# Patient Record
Sex: FEMALE | Race: WHITE | Employment: PART TIME | URBAN - METROPOLITAN AREA
[De-identification: names, ages, dates, MRNs, and addresses within clinical notes are randomized per-mention and may not be internally consistent; named-entity substitution may affect disease eponyms.]

---

## 2017-01-23 ENCOUNTER — OFFICE VISIT (OUTPATIENT)
Dept: FAMILY MEDICINE CLINIC | Age: 22
End: 2017-01-23

## 2017-01-23 VITALS
HEART RATE: 84 BPM | DIASTOLIC BLOOD PRESSURE: 72 MMHG | OXYGEN SATURATION: 97 % | TEMPERATURE: 98.1 F | HEIGHT: 69 IN | SYSTOLIC BLOOD PRESSURE: 118 MMHG | RESPIRATION RATE: 16 BRPM | BODY MASS INDEX: 27.7 KG/M2 | WEIGHT: 187 LBS

## 2017-01-23 DIAGNOSIS — N92.0 MENORRHAGIA WITH REGULAR CYCLE: Primary | ICD-10-CM

## 2017-01-23 DIAGNOSIS — Z30.41 ORAL CONTRACEPTIVE USE: ICD-10-CM

## 2017-01-23 DIAGNOSIS — Z23 ENCOUNTER FOR IMMUNIZATION: ICD-10-CM

## 2017-01-23 RX ORDER — NORGESTIMATE AND ETHINYL ESTRADIOL 7DAYSX3 LO
1 KIT ORAL DAILY
Qty: 3 PACKAGE | Refills: 4 | Status: SHIPPED | OUTPATIENT
Start: 2017-01-23 | End: 2018-01-26 | Stop reason: SDUPTHER

## 2017-01-23 NOTE — MR AVS SNAPSHOT
Visit Information Date & Time Provider Department Dept. Phone Encounter #  
 1/23/2017  8:30 AM Lynn Granda, [address] [id number] Follow-up Instructions Return for need an appt for physical . Upcoming Health Maintenance Date Due  
 HPV AGE 9Y-34Y (3 of 3 - Female 3 Dose Series) 8/9/2008 PAP AKA CERVICAL CYTOLOGY 2/8/2016 DTaP/Tdap/Td series (7 - Td) 1/23/2027 Allergies as of 1/23/2017  Review Complete On: 1/23/2017 By: Lynn Granda MD  
 No Known Allergies Current Immunizations  Reviewed on 1/17/2012 Name Date DTAP Vaccine 3/5/1996, 1995, 1995, 1995 HIB Vaccine 3/5/1996, 1995, 1995, 1995 HPV (9-valent) 1/23/2017 Hepatitis B Vaccine 1995, 1995, 1995 Human Papillomavirus 4/9/2008, 8/24/2007 IPV 1995, 1995, 1995 Influenza Vaccine Split 12/2/2010 MMR Vaccine 3/5/1996 TB Skin Test (PPD) 10/19/2016 TB Skin Test (PPD) Intradermal 10/15/2013 TDAP Vaccine 7/13/2006 Not reviewed this visit You Were Diagnosed With   
  
 Codes Comments Menorrhagia with regular cycle    -  Primary ICD-10-CM: N92.0 ICD-9-CM: 626.2 Oral contraceptive use     ICD-10-CM: Z30.41 ICD-9-CM: V25.41 Encounter for immunization     ICD-10-CM: D59 ICD-9-CM: V03.89 Vitals BP Pulse Temp Resp Height(growth percentile) Weight(growth percentile) 118/72 (BP 1 Location: Left arm, BP Patient Position: Sitting) 84 98.1 °F (36.7 °C) (Oral) 16 5' 9\" (1.753 m) 187 lb (84.8 kg) LMP SpO2 BMI OB Status Smoking Status 01/17/2017 97% 27.62 kg/m2 Having regular periods Never Smoker Vitals History BMI and BSA Data Body Mass Index Body Surface Area  
 27.62 kg/m 2 2.03 m 2 Preferred Pharmacy Pharmacy Name Phone [facility], [address] [place] [address] [phone] Your Updated Medication List  
 This list is accurate as of: 1/23/17  8:52 AM.  Always use your most recent med list.  
  
  
  
  
 albuterol 90 mcg/actuation inhaler Commonly known as:  PROVENTIL HFA, VENTOLIN HFA, PROAIR HFA Take 2 Puffs by inhalation every four (4) hours as needed for Wheezing. cetirizine 10 mg tablet Commonly known as:  ZYRTEC  
TAKE ONE TABLET BY MOUTH ONE TIME DAILY  
  
 fluticasone 50 mcg/actuation nasal spray Commonly known as:  Yaakov Agata 2 Sprays by Both Nostrils route daily. montelukast 10 mg tablet Commonly known as:  SINGULAIR Take 1 Tab by mouth daily. norgestimate-ethinyl estradiol 0.18/0.215/0.25 mg-25 mcg Tab Commonly known as:  ORTHO TRI-CYCLEN LO Take 1 Tab by mouth daily. 1 tab daily as directed per package PAMPRIN MULTI-SYMPTOM PO Take  by mouth. TYLENOL 325 mg tablet Generic drug:  acetaminophen Take 650 mg by mouth daily as needed for Pain. Prescriptions Sent to Pharmacy Refills  
 norgestimate-ethinyl estradiol (ORTHO TRI-CYCLEN LO) 0.18/0.215/0.25 mg-25 mcg tab 4 Sig: Take 1 Tab by mouth daily. 1 tab daily as directed per package Class: Normal  
 Pharmacy: [address] #: [phone] Route: Oral  
  
We Performed the Following HUMAN PAPILLOMA VIRUS NONAVALENT HPV 3 DOSE IM (GARDASIL 9) [76151 CPT(R)] Follow-up Instructions Return for need an appt for physical . Introducing [facility]! Dear Kyle Wolf: 
Thank you for requesting a [employer] account. Our records indicate that you already have an active [employer] account. You can access your account anytime at [email or web address] Did you know that you can access your hospital and ER discharge instructions at any time in [employer]? You can also review all of your test results from your hospital stay or ER visit. Additional Information If you have questions, please visit the Frequently Asked Questions section of the [employer] website at [email or web address] Remember, [employer] is NOT to be used for urgent needs. For medical emergencies, dial 911. Now available from your iPhone and Android! Please provide this summary of care documentation to your next provider. Your primary care clinician is listed as [place]. If you have any questions after today's visit, please call [phone].

## 2017-01-23 NOTE — PROGRESS NOTES
Verbal order for 3rd HPV (Gardisil 9) vaccine    Patient given 3rd HPV vaccine in L deltoid. Patient tolerated well. No adverse effects noted.     Lot [id number]  Exp 04/14/2019  [facility]  [id number]

## 2017-01-23 NOTE — PROGRESS NOTES
HISTORY OF PRESENT ILLNESS  Jamie Foss is a 24 y.o. female. HPI: here to get a refill for birth control pills. She was started on it due to menorrhagia. Which has been improved. Most of the time menstrual period is regular but said this month she has missed one pill and she has period one week early. Discuss with her that it is probably due to missing pills. Observe. Denies any side effects. No mood changes. Vitals stable. Visit Vitals    /72 (BP 1 Location: Left arm, BP Patient Position: Sitting)    Pulse 84    Temp 98.1 °F (36.7 °C) (Oral)    Resp 16    Ht 5' 9\" (1.753 m)    Wt 187 lb (84.8 kg)    SpO2 97%    BMI 27.62 kg/m2   Denies any headache, dizziness, no chest pain or trouble breathing, no arm or leg weakness. No nausea or vomiting, no weight or appetite changes, no depression or anxiety. No urine or bowel complains, no palpitation, no diaphoresis. No family history of DVT. Does not smoke. Discuss with her that due for pap smear. She never had one. She will make an appt as she wants her mother to be present. ROS: see HPI     Physical Exam   Constitutional: She is oriented to person, place, and time. No distress. Neck: No thyromegaly present. Cardiovascular: Normal heart sounds. Pulmonary/Chest: No respiratory distress. She has no wheezes. Abdominal: Soft. There is no tenderness. Musculoskeletal: She exhibits no edema. Neurological: She is oriented to person, place, and time. Psychiatric: Her behavior is normal.       ASSESSMENT and PLAN    ICD-10-CM ICD-9-CM    1. Menorrhagia with regular cycle: improved on OCPs. Recently had period. No side effects. Discuss compliance with medication. She will schedule an appt for pap smear later on.  N92.0 626.2 norgestimate-ethinyl estradiol (ORTHO TRI-CYCLEN LO) 0.18/0.215/0.25 mg-25 mcg tab   2. Oral contraceptive use Z30.41 V25.41 norgestimate-ethinyl estradiol (ORTHO TRI-CYCLEN LO) 0.18/0.215/0.25 mg-25 mcg tab   3. Encounter for immunization Z23 V03.89 HUMAN PAPILLOMA VIRUS NONAVALENT HPV 3 DOSE IM (GARDASIL 9)   Refused tdap today. Ok to get 3 rd dose of HPV per patient. Pt understood and agrees with above plan.    Review    Follow-up Disposition:  Return for need an appt for physical .

## 2017-01-23 NOTE — PROGRESS NOTES
1. Have you been to the ER, urgent care clinic since your last visit? Hospitalized since your last visit? Yes 2016 [facility] [place] ED for stomach virus 8/28/16. 2. Have you seen or consulted any other health care providers outside of the [facility] since your last visit? Include any pap smears or colon screening.  No

## 2017-01-27 DIAGNOSIS — N92.0 MENORRHAGIA WITH REGULAR CYCLE: ICD-10-CM

## 2017-01-27 DIAGNOSIS — Z30.41 ORAL CONTRACEPTIVE USE: ICD-10-CM

## 2017-01-27 RX ORDER — NORGESTIMATE AND ETHINYL ESTRADIOL 7DAYSX3 LO
1 KIT ORAL DAILY
Qty: 3 PACKAGE | Refills: 4 | Status: CANCELLED | OUTPATIENT
Start: 2017-01-27

## 2017-02-22 ENCOUNTER — OFFICE VISIT (OUTPATIENT)
Dept: FAMILY MEDICINE CLINIC | Age: 22
End: 2017-02-22

## 2017-02-22 VITALS
HEART RATE: 92 BPM | TEMPERATURE: 97.9 F | DIASTOLIC BLOOD PRESSURE: 82 MMHG | RESPIRATION RATE: 14 BRPM | HEIGHT: 69 IN | OXYGEN SATURATION: 98 % | WEIGHT: 191 LBS | SYSTOLIC BLOOD PRESSURE: 116 MMHG | BODY MASS INDEX: 28.29 KG/M2

## 2017-02-22 DIAGNOSIS — Z13.1 SCREENING FOR DIABETES MELLITUS: ICD-10-CM

## 2017-02-22 DIAGNOSIS — Z01.419 WELL WOMAN EXAM WITH ROUTINE GYNECOLOGICAL EXAM: ICD-10-CM

## 2017-02-22 DIAGNOSIS — Z13.220 SCREENING FOR HYPERLIPIDEMIA: ICD-10-CM

## 2017-02-22 DIAGNOSIS — Z12.4 SCREENING FOR CERVICAL CANCER: ICD-10-CM

## 2017-02-22 DIAGNOSIS — Z13.0 SCREENING FOR DEFICIENCY ANEMIA: ICD-10-CM

## 2017-02-22 DIAGNOSIS — Z01.419 WELL WOMAN EXAM WITH ROUTINE GYNECOLOGICAL EXAM: Primary | ICD-10-CM

## 2017-02-22 NOTE — PROGRESS NOTES
Subjective:   25 y.o. female for Well Woman Check. Patient's last menstrual period was 02/07/2017 (within weeks). Regular menstrual period. No concern. Sexually active with one partner. No prior h/o std. No previous pap smear done. Sexually active since age 21. No pelvic pain or vaginal discharge. No abdominal pain. Said she gets on and off discomfort with sexual activity but no bleeding or spotting after sexual activity. Does self breast exam.   No concern. She had her friend present through out the visit and also during physical exam present in the exam room. Pt has given permission to ask all the questions and permission to do physical exam including pap smear and breast exam in her presence. Patient Active Problem List    Diagnosis Date Noted    Focal nodular hyperplasia of liver     ADHD (attention deficit hyperactivity disorder) 12/02/2010    Back pain 12/02/2010     Current Outpatient Prescriptions   Medication Sig Dispense Refill    norgestimate-ethinyl estradiol (ORTHO TRI-CYCLEN LO) 0.18/0.215/0.25 mg-25 mcg tab Take 1 Tab by mouth daily. 1 tab daily as directed per package 3 Package 4    cetirizine (ZYRTEC) 10 mg tablet TAKE ONE TABLET BY MOUTH ONE TIME DAILY 90 Tab 0    acetaminophen (TYLENOL) 325 mg tablet Take 650 mg by mouth daily as needed for Pain.  montelukast (SINGULAIR) 10 mg tablet Take 1 Tab by mouth daily. 30 Tab 3    albuterol (PROVENTIL HFA, VENTOLIN HFA) 90 mcg/actuation inhaler Take 2 Puffs by inhalation every four (4) hours as needed for Wheezing. 1 Inhaler 3    fluticasone (FLONASE) 50 mcg/actuation nasal spray 2 Sprays by Both Nostrils route daily. 1 Bottle 2    ACETAMINOPHEN/PYRILAM/PAMABROM (PAMPRIN MULTI-SYMPTOM PO) Take  by mouth.          No Known Allergies  Past Medical History:   Diagnosis Date    ADHD     weaned off on meds, per pt doing well    AR (allergic rhinitis)     Asthma     Dyslexia     Focal nodular hyperplasia of liver     recommend MRI repeat 7/2014    Kidney stone     RAD (reactive airway disease)      No past surgical history on file. Family History   Problem Relation Age of Onset    Diabetes Father     Hypertension Brother     Hypertension Maternal Grandfather     Heart Attack Maternal Grandfather     Heart Surgery Maternal Grandfather     Diabetes Paternal Grandmother     Hypertension Paternal Grandmother      Social History   Substance Use Topics    Smoking status: Never Smoker    Smokeless tobacco: Never Used    Alcohol use No        ROS:  Feeling well. No dyspnea or chest pain on exertion. No abdominal pain, change in bowel habits, black or bloody stools. No urinary tract symptoms. GYN ROS: normal menses, no abnormal bleeding, pelvic pain or discharge, no breast pain or new or enlarging lumps on self exam. No neurological complaints. Objective:     Visit Vitals    /82 (BP 1 Location: Left arm, BP Patient Position: Sitting)    Pulse 92    Temp 97.9 °F (36.6 °C) (Oral)    Resp 14    Ht 5' 9\" (1.753 m)    Wt 191 lb (86.6 kg)    LMP 02/07/2017 (Within Weeks)    SpO2 98%    BMI 28.21 kg/m2     The patient appears well, alert, oriented x 3, in no distress. ENT normal.  Neck supple. No adenopathy or thyromegaly. KRISTA. Lungs are clear, good air entry, no wheezes, rhonchi or rales. S1 and S2 normal, no murmurs, regular rate and rhythm. Abdomen soft without tenderness, guarding, mass or organomegaly. Extremities show no edema, normal peripheral pulses. Neurological is normal, no focal findings. BREAST EXAM: breasts appear normal, no suspicious masses, no skin or nipple changes or axillary nodes    PELVIC EXAM: normal external genitalia, vulva, vagina, cervix, uterus and adnexa  Breast exam and pelvic exam done in presence of nurse ET. Assessment/Plan:       ICD-10-CM ICD-9-CM    1. Well woman exam with routine gynecological exam Y59.056 B04.71 METABOLIC PANEL, COMPREHENSIVE    [V72.31]   2.  Screening for cervical cancer Z12.4 V76.2 PAP IG, CT-NG-TV, APTIMA HPV AND RFX 63/34,36([id number])   3. Screening for deficiency anemia Z13.0 V78.1 CBC W/O DIFF   4. Screening for diabetes mellitus Z13.1 V77.1 HEMOGLOBIN A1C WITH EAG   5. Screening for hyperlipidemia Z13.220 V77.91 LIPID PANEL     Follow-up Disposition:  Return in about 1 year (around 2/22/2018). Gladys Hair

## 2017-02-22 NOTE — MR AVS SNAPSHOT
Visit Information Date & Time Provider Department Dept. Phone Encounter #  
 2/22/2017  2:30 PM Dominik James, 0 [facility] [phone] [id number] Follow-up Instructions Return in about 1 year (around 2/22/2018). Upcoming Health Maintenance Date Due  
 PAP AKA CERVICAL CYTOLOGY 2/8/2016 DTaP/Tdap/Td series (7 - Td) 1/23/2027 Allergies as of 2/22/2017  Review Complete On: 1/23/2017 By: Dominik James MD  
 No Known Allergies Current Immunizations  Reviewed on 1/17/2012 Name Date DTAP Vaccine 3/5/1996, 1995, 1995, 1995 HIB Vaccine 3/5/1996, 1995, 1995, 1995 HPV (9-valent) 1/23/2017 Hepatitis B Vaccine 1995, 1995, 1995 Human Papillomavirus 4/9/2008, 8/24/2007 IPV 1995, 1995, 1995 Influenza Vaccine Split 12/2/2010 MMR Vaccine 3/5/1996 TB Skin Test (PPD) 10/19/2016 TB Skin Test (PPD) Intradermal 10/15/2013 TDAP Vaccine 7/13/2006 Not reviewed this visit You Were Diagnosed With   
  
 Codes Comments Well woman exam with routine gynecological exam    -  Primary ICD-10-CM: O74.553 ICD-9-CM: V72.31 [V72.31] Screening for cervical cancer     ICD-10-CM: Z12.4 ICD-9-CM: V76.2 Screening for deficiency anemia     ICD-10-CM: Z13.0 ICD-9-CM: V78.1 Screening for diabetes mellitus     ICD-10-CM: Z13.1 ICD-9-CM: V77.1 Screening for hyperlipidemia     ICD-10-CM: Z13.220 ICD-9-CM: V77.91 Vitals BP  
  
  
  
  
  
 116/82 (BP 1 Location: Left arm, BP Patient Position: Sitting) Vitals History BMI and BSA Data Body Mass Index Body Surface Area  
 28.21 kg/m 2 2.05 m 2 Preferred Pharmacy Pharmacy Name Phone [facility], [address] [place] [address] [phone] Your Updated Medication List  
  
   
This list is accurate as of: 2/22/17  3:31 PM.  Always use your most recent med list.  
  
  
  
  
 albuterol 90 mcg/actuation inhaler Commonly known as:  PROVENTIL HFA, VENTOLIN HFA, PROAIR HFA Take 2 Puffs by inhalation every four (4) hours as needed for Wheezing. cetirizine 10 mg tablet Commonly known as:  ZYRTEC  
TAKE ONE TABLET BY MOUTH ONE TIME DAILY  
  
 fluticasone 50 mcg/actuation nasal spray Commonly known as:  Devora Reges 2 Sprays by Both Nostrils route daily. montelukast 10 mg tablet Commonly known as:  SINGULAIR Take 1 Tab by mouth daily. norgestimate-ethinyl estradiol 0.18/0.215/0.25 mg-25 mcg Tab Commonly known as:  ORTHO TRI-CYCLEN LO Take 1 Tab by mouth daily. 1 tab daily as directed per package PAMPRIN MULTI-SYMPTOM PO Take  by mouth. TYLENOL 325 mg tablet Generic drug:  acetaminophen Take 650 mg by mouth daily as needed for Pain. Follow-up Instructions Return in about 1 year (around 2/22/2018). To-Do List   
 02/22/2017 Lab:  CBC W/O DIFF   
  
 02/22/2017 Lab:  HEMOGLOBIN A1C WITH EAG   
  
 02/22/2017 Lab:  LIPID PANEL   
  
 02/22/2017 Lab:  METABOLIC PANEL, COMPREHENSIVE   
  
 02/22/2017 Pathology:  PAP IG, CT-NG-TV, APTIMA HPV AND [address] 91/61,54([id number]) [facility]! Dear Mecca Kwan: 
Thank you for requesting a [employer] account. Our records indicate that you already have an active [employer] account. You can access your account anytime at [email or web address] Did you know that you can access your hospital and ER discharge instructions at any time in [employer]? You can also review all of your test results from your hospital stay or ER visit. Additional Information If you have questions, please visit the Frequently Asked Questions section of the [employer] website at [email or web address] Remember, [employer] is NOT to be used for urgent needs. For medical emergencies, dial 911. Now available from your iPhone and Android! Please provide this summary of care documentation to your next provider. Your primary care clinician is listed as Rosana Laura. If you have any questions after today's visit, please call [phone].

## 2017-02-22 NOTE — PROGRESS NOTES
Chief Complaint   Patient presents with    Well Woman     with pap; pain with intercourse; denies discharge     Patient presents for annual pap smear. Last pap-this will be the first. Abnormal Pap smears: n/a. Procedures if indicated: n/a . Form of contraception: oral. Mammogram (if indicated) n/a. Family history of breast CA: patient denies  colon CA: patient denies  cervical CA: patient denies  Tetanus: due for booster    1. Have you been to the ER, urgent care clinic since your last visit? Hospitalized since your last visit? No    2. Have you seen or consulted any other health care providers outside of the [address] [place] [address] since your last visit? Include any pap smears or colon screening.  No

## 2017-02-24 LAB
CHLAMYDIA TRACHOMATIS THINPREP, 13342: NEGATIVE
HPV HIGH RISK, 507303: NEGATIVE
NEISSERIA GONORRHOEAE THINPREP, 13343: NEGATIVE
PAP IMAGE GUIDED, 8900296: NORMAL
TRICHOMONAS NUC AMP-THIN PREP,13357: NEGATIVE

## 2017-03-09 NOTE — PROGRESS NOTES
Spoke with patient (2 verifiers name/) regarding pap smear, HPV test and STD testing came back negative. Patient voiced understanding.

## 2018-01-29 PROBLEM — N92.0 MENORRHAGIA WITH REGULAR CYCLE: Status: ACTIVE | Noted: 2018-01-29

## 2018-02-20 ENCOUNTER — HOSPITAL ENCOUNTER (OUTPATIENT)
Dept: GENERAL RADIOLOGY | Age: 23
Discharge: HOME | End: 2018-02-20
Admitting: FAMILY MEDICINE
Payer: COMMERCIAL

## 2018-02-20 ENCOUNTER — OFFICE VISIT (OUTPATIENT)
Dept: FAMILY MEDICINE CLINIC | Age: 23
End: 2018-02-20

## 2018-02-20 VITALS
TEMPERATURE: 97.8 F | BODY MASS INDEX: 30.18 KG/M2 | WEIGHT: 203.8 LBS | RESPIRATION RATE: 16 BRPM | SYSTOLIC BLOOD PRESSURE: 120 MMHG | OXYGEN SATURATION: 94 % | HEIGHT: 69 IN | HEART RATE: 107 BPM | DIASTOLIC BLOOD PRESSURE: 80 MMHG

## 2018-02-20 DIAGNOSIS — R22.1 NECK SWELLING: ICD-10-CM

## 2018-02-20 DIAGNOSIS — N92.0 MENORRHAGIA WITH REGULAR CYCLE: ICD-10-CM

## 2018-02-20 DIAGNOSIS — R60.0 SALIVARY GLAND SWELLING: Primary | ICD-10-CM

## 2018-02-20 PROCEDURE — 70360 X-RAY EXAM OF NECK: CPT

## 2018-02-20 RX ORDER — NORGESTIMATE AND ETHINYL ESTRADIOL 7DAYSX3 LO
1 KIT ORAL DAILY
Qty: 84 TAB | Refills: 3 | Status: SHIPPED | OUTPATIENT
Start: 2018-02-20 | End: 2019-02-15 | Stop reason: SDUPTHER

## 2018-02-20 NOTE — MR AVS SNAPSHOT
[address] [place] [address] [address] [place] [address] 
[phone] Patient: Justin Art MRN: [id number] HPI:7/0/2379 Visit Information Date & Time Provider Department Dept. Phone Encounter #  
 2/20/2018 11:45 AM Elmer Ramachandran, [address] [place] [address] Road [id number] Follow-up Instructions Return in about 2 weeks (around 3/6/2018). Upcoming Health Maintenance Date Due  
 PAP AKA CERVICAL CYTOLOGY 2/23/2020 DTaP/Tdap/Td series (7 - Td) 1/23/2027 Allergies as of 2/20/2018  Review Complete On: 2/20/2018 By: Elmer Ramachandran MD  
 No Known Allergies Current Immunizations  Reviewed on 1/17/2012 Name Date DTAP Vaccine 3/5/1996, 1995, 1995, 1995 HIB Vaccine 3/5/1996, 1995, 1995, 1995 HPV (9-valent) 1/23/2017 Hepatitis B Vaccine 1995, 1995, 1995 Human Papillomavirus 4/9/2008, 8/24/2007 IPV 1995, 1995, 1995 Influenza Vaccine Split 12/2/2010 MMR Vaccine 3/5/1996 TB Skin Test (PPD) 10/19/2016 TB Skin Test (PPD) Intradermal 10/15/2013 TDAP Vaccine 7/13/2006 Not reviewed this visit You Were Diagnosed With   
  
 Codes Comments Salivary gland swelling    -  Primary ICD-10-CM: R60.9 ICD-9-CM: 782.3 Menorrhagia with regular cycle     ICD-10-CM: N92.0 ICD-9-CM: 626.2 Neck swelling     ICD-10-CM: R22.1 ICD-9-CM: 212. 2 Vitals BP Pulse Temp Resp Height(growth percentile) Weight(growth percentile) 120/80 (BP 1 Location: Left arm, BP Patient Position: Sitting) (!) 107 97.8 °F (36.6 °C) (Oral) 16 5' 9\" (1.753 m) 203 lb 12.8 oz (92.4 kg) LMP SpO2 BMI OB Status Smoking Status 02/01/2018 94% 30.1 kg/m2 Having regular periods Never Smoker Vitals History BMI and BSA Data Body Mass Index Body Surface Area  
 30.1 kg/m 2 2.12 m 2 Preferred Pharmacy Pharmacy Name Phone [address] [phone] Your Updated Medication List  
  
   
This list is accurate as of: 2/20/18 12:32 PM.  Always use your most recent med list.  
  
  
  
  
 albuterol 90 mcg/actuation inhaler Commonly known as:  PROVENTIL HFA, VENTOLIN HFA, PROAIR HFA Take 2 Puffs by inhalation every four (4) hours as needed for Wheezing. cetirizine 10 mg tablet Commonly known as:  ZYRTEC  
TAKE ONE TABLET BY MOUTH ONE TIME DAILY  
  
 fluticasone 50 mcg/actuation nasal spray Commonly known as:  Taylor Finders 2 Sprays by Both Nostrils route daily. montelukast 10 mg tablet Commonly known as:  SINGULAIR Take 1 Tab by mouth daily. norgestimate-ethinyl estradiol 0.18/0.215/0.25 mg-25 mcg Tab Commonly known as:  TRI-LO-HARRISON Take 1 Tab by mouth daily. PAMPRIN MULTI-SYMPTOM PO Take  by mouth. TYLENOL 325 mg tablet Generic drug:  acetaminophen Take 650 mg by mouth daily as needed for Pain. Prescriptions Sent to Pharmacy Refills  
 norgestimate-ethinyl estradiol (TRI-LO-HARRISON) 0.18/0.215/0.25 mg-25 mcg tab 3 Sig: Take 1 Tab by mouth daily. Class: Normal  
 Pharmacy: [address] #: [phone] Route: Oral  
  
Follow-up Instructions Return in about 2 weeks (around 3/6/2018). To-Do List   
 02/20/2018 Imaging:  XR NECK SOFT TISSUE Patient Instructions Mononucleosis: Care Instructions Your Care Instructions Mononucleosis, also called mono, is an infection that is usually caused by the Hector-Barr virus. Mono is spread through contact with saliva, mucus from the nose and throat, and sometimes tears or blood. You can get mono by kissing a person who is infected. Or you may get it by sharing a drinking glass or eating utensils with someone who has mono. That person may not be sick at the time or may have had mono long before. Mono may cause your spleen to swell. The spleen is an organ in the upper left side of your belly. A blow to the belly can cause a swollen spleen to break open. In very rare cases, the spleen may burst on its own. Most people recover fully after several weeks. But it may take several months before your normal energy is back. The lymph nodes in your neck may be larger than normal for up to 1 month. Getting lots of rest and keeping your schedule light will help you feel better. Time helps you recover. Follow-up care is a key part of your treatment and safety. Be sure to make and go to all appointments, and call your doctor if you are having problems. It's also a good idea to know your test results and keep a list of the medicines you take. How can you care for yourself at home? · Get plenty of rest. Stay in bed until you feel well enough to be up. · Drink plenty of fluids, enough so that your urine is light yellow or clear like water. If you have kidney, heart, or liver disease and have to limit fluids, talk with your doctor before you increase the amount of fluids you drink. · Take your medicines exactly as prescribed. Call your doctor if you think you are having a problem with your medicine. · For a sore throat, suck on lozenges or gargle with salt water. To make salt water, mix 1 teaspoon of salt in 8 ounces of warm water. · Take an over-the-counter pain medicine, such as acetaminophen (Tylenol), ibuprofen (Advil, Motrin), or naproxen (Aleve) for a sore throat or headache or to lower a fever. Read and follow all instructions on the label. · Do not take two or more pain medicines at the same time unless the doctor told you to. Many pain medicines have acetaminophen, which is Tylenol. Too much acetaminophen (Tylenol) can be harmful. · Do not play contact sports for 4 weeks. Do not lift anything heavy.  Too much activity increases the chance that your spleen may break open. · Try not to spread the virus to others. Do not kiss or share dishes, glasses, eating utensils, or toothbrushes for at least two weeks. The virus is spread when saliva from an infected person gets in another person's mouth. It is hard to know how long you may be contagious. · If you know you have mono, do not donate blood. There is a chance of spreading the virus through blood products. When should you call for help? Call 911 anytime you think you may need emergency care. For example, call if: 
? · You passed out (lost consciousness). ?Call your doctor now or seek immediate medical care if: 
? · You have new or worse belly pain. ? · You have signs of needing more fluids. You have sunken eyes and a dry mouth, and you pass only a little urine. ? · You are dizzy or lightheaded, or you feel like you may faint. ? · You cannot swallow fluids. ? Watch closely for changes in your health, and be sure to contact your doctor if: 
? · You do not get better as expected. Where can you learn more? Go to http://mikaela-amaris.info/. Enter C782 in the search box to learn more about \"Mononucleosis: Care Instructions. \" Current as of: March 3, 2017 Content Version: 11.4 © 5607-6870 [employer]. Care instructions adapted under license by [employer] (which disclaims liability or warranty for this information). If you have questions about a medical condition or this instruction, always ask your healthcare professional. [facility] any warranty or liability for your use of this information. Introducing [facility]! Dear Shannon Clark: 
Thank you for requesting a [employer] account. Our records indicate that you already have an active [employer] account. You can access your account anytime at [email or web address] Did you know that you can access your hospital and ER discharge instructions at any time in [employer]? You can also review all of your test results from your hospital stay or ER visit. Additional Information If you have questions, please visit the Frequently Asked Questions section of the [employer] website at [email or web address] Remember, [employer] is NOT to be used for urgent needs. For medical emergencies, dial 911. Now available from your iPhone and Android! Please provide this summary of care documentation to your next provider. Your primary care clinician is listed as Marcos Delgado. If you have any questions after today's visit, please call [phone].

## 2018-02-20 NOTE — PROGRESS NOTES
HISTORY OF PRESENT ILLNESS  Martha Moss is a 21 y.o. female. HPI: Here [facility]. [facility] had swollen [facility] glands [facility] She was diagnosed with infectious mononucleosis. She had no fever, no sore throat. No trouble swallowing or any change in voice. No noisy breathing. No abdominal pain. No nausea or vomiting. Still looks neck swollen. No obvious gland palpable . no abdominal pain or splenic enlargement. Visit Vitals    /80 (BP 1 Location: Left arm, BP Patient Position: Sitting)    Pulse (!) 107    Temp 97.8 °F (36.6 °C) (Oral)    Resp 16    Ht 5' 9\" (1.753 m)    Wt 203 lb 12.8 oz (92.4 kg)    SpO2 94%    BMI 30.1 kg/m2     Review medication list, vitals, problem list,allergies. ROS: see HPI     Physical Exam   Constitutional: She is oriented to person, place, and time. No distress. HENT:   Neck: general appearance looks swollen. No palpable neck gland enlargement. Cardiovascular: Normal heart sounds. Pulmonary/Chest: No respiratory distress. She has no wheezes. Abdominal: Soft. She exhibits no mass. There is no tenderness. Neurological: She is oriented to person, place, and time. ASSESSMENT and PLAN    ICD-10-CM ICD-9-CM    1. Salivary gland swelling: improved at this time and still looks neck swollen. ? Soft tissue swelling. Will obtain neck x-ray. Per her positive test for mononucleosis. [facility]. record release signed. R60.9 782.3    2. Menorrhagia with regular cycle N92.0 626.2 norgestimate-ethinyl estradiol (TRI-LO-HARRISON) 0.18/0.215/0.25 mg-25 mcg tab   3. Neck swelling R22.1 784. 2 XR NECK SOFT TISSUE   Pt understood and agree with the plan     Follow-up Disposition:  Return in about 2 weeks (around 3/6/2018).

## 2018-02-20 NOTE — PROGRESS NOTES
1. Have you been to the ER, urgent care clinic since your last visit? Hospitalized since your last visit? [facility] [address] 2/08/18    2. Have you seen or consulted any other health care providers outside of the [address] [place] [address] since your last visit? Include any pap smears or colon screening. No    Patient declined flu vaccine.

## 2018-02-20 NOTE — PATIENT INSTRUCTIONS
Mononucleosis: Care Instructions  Your Care Instructions    Mononucleosis, also called mono, is an infection that is usually caused by the Hector-Barr virus. Mono is spread through contact with saliva, mucus from the nose and throat, and sometimes tears or blood. You can get mono by kissing a person who is infected. Or you may get it by sharing a drinking glass or eating utensils with someone who has mono. That person may not be sick at the time or may have had mono long before. Mono may cause your spleen to swell. The spleen is an organ in the upper left side of your belly. A blow to the belly can cause a swollen spleen to break open. In very rare cases, the spleen may burst on its own. Most people recover fully after several weeks. But it may take several months before your normal energy is back. The lymph nodes in your neck may be larger than normal for up to 1 month. Getting lots of rest and keeping your schedule light will help you feel better. Time helps you recover. Follow-up care is a key part of your treatment and safety. Be sure to make and go to all appointments, and call your doctor if you are having problems. It's also a good idea to know your test results and keep a list of the medicines you take. How can you care for yourself at home? · Get plenty of rest. Stay in bed until you feel well enough to be up. · Drink plenty of fluids, enough so that your urine is light yellow or clear like water. If you have kidney, heart, or liver disease and have to limit fluids, talk with your doctor before you increase the amount of fluids you drink. · Take your medicines exactly as prescribed. Call your doctor if you think you are having a problem with your medicine. · For a sore throat, suck on lozenges or gargle with salt water. To make salt water, mix 1 teaspoon of salt in 8 ounces of warm water.   · Take an over-the-counter pain medicine, such as acetaminophen (Tylenol), ibuprofen (Advil, Motrin), or naproxen (Aleve) for a sore throat or headache or to lower a fever. Read and follow all instructions on the label. · Do not take two or more pain medicines at the same time unless the doctor told you to. Many pain medicines have acetaminophen, which is Tylenol. Too much acetaminophen (Tylenol) can be harmful. · Do not play contact sports for 4 weeks. Do not lift anything heavy. Too much activity increases the chance that your spleen may break open. · Try not to spread the virus to others. Do not kiss or share dishes, glasses, eating utensils, or toothbrushes for at least two weeks. The virus is spread when saliva from an infected person gets in another person's mouth. It is hard to know how long you may be contagious. · If you know you have mono, do not donate blood. There is a chance of spreading the virus through blood products. When should you call for help? Call 911 anytime you think you may need emergency care. For example, call if:  ? · You passed out (lost consciousness). ?Call your doctor now or seek immediate medical care if:  ? · You have new or worse belly pain. ? · You have signs of needing more fluids. You have sunken eyes and a dry mouth, and you pass only a little urine. ? · You are dizzy or lightheaded, or you feel like you may faint. ? · You cannot swallow fluids. ? Watch closely for changes in your health, and be sure to contact your doctor if:  ? · You do not get better as expected. Where can you learn more? Go to http://mikaela-amaris.info/. Enter N825 in the search box to learn more about \"Mononucleosis: Care Instructions. \"  Current as of: March 3, 2017  Content Version: 11.4  © 9462-3233 [employer]. Care instructions adapted under license by [employer] (which disclaims liability or warranty for this information).  If you have questions about a medical condition or this instruction, always ask your healthcare professional. [employer] disclaims any warranty or liability for your use of this information.

## 2018-02-21 NOTE — PROGRESS NOTES
Let pt know that air way is patent. Some swelling could be from mononucleosis. For now keep follow up appt and if any worsening of symptoms like change in voice, trouble swallowing or sore throat , fever or any enlarge neck gland again go to ER or urgent care.

## 2018-03-08 ENCOUNTER — TELEPHONE (OUTPATIENT)
Dept: FAMILY MEDICINE CLINIC | Age: 23
End: 2018-03-08

## 2018-03-09 NOTE — TELEPHONE ENCOUNTER
Contacted patient and verified identity using name and date of birth (2- identifiers)  Spoke with patient regarding Xray results and she reports that her sx have resolved. Patient aware of results.

## 2018-03-19 ENCOUNTER — OFFICE VISIT (OUTPATIENT)
Dept: FAMILY MEDICINE CLINIC | Age: 23
End: 2018-03-19

## 2018-03-19 VITALS
OXYGEN SATURATION: 97 % | HEIGHT: 69 IN | RESPIRATION RATE: 16 BRPM | WEIGHT: 211 LBS | TEMPERATURE: 97.9 F | HEART RATE: 91 BPM | SYSTOLIC BLOOD PRESSURE: 118 MMHG | DIASTOLIC BLOOD PRESSURE: 72 MMHG | BODY MASS INDEX: 31.25 KG/M2

## 2018-03-19 DIAGNOSIS — R22.1 NECK SWELLING: Primary | ICD-10-CM

## 2018-03-19 NOTE — MR AVS SNAPSHOT
[facility]ly [facility] [address] 
[phone] Patient: Natacha Molina MRN: [id number] CEX:9[birth year] Visit Information Date & Time Provider Department Dept. Phone Encounter #  
 3/19/2018  9:15 AM Carroll Roblero, [address] [id number] Follow-up Instructions Return in about 4 months (around 7/19/2018). Upcoming Health Maintenance Date Due  
 PAP AKA CERVICAL CYTOLOGY 2/23/2020 DTaP/Tdap/Td series (7 - Td) 1/23/2027 Allergies as of 3/19/2018  Review Complete On: 3/19/2018 By: Carroll Roblero MD  
 No Known Allergies Current Immunizations  Reviewed on 1/17/2012 Name Date DTAP Vaccine 3/5/1996, 1995, 1995, 1995 HIB Vaccine 3/5/1996, 1995, 1995, 1995 HPV (9-valent) 1/23/2017 Hepatitis B Vaccine 1995, 1995, 1995 Human Papillomavirus 4/9/2008, 8/24/2007 IPV 1995, 1995, 1995 Influenza Vaccine Split 12/2/2010 MMR Vaccine 3/5/1996 TB Skin Test (PPD) 10/19/2016 TB Skin Test (PPD) Intradermal 10/15/2013 TDAP Vaccine 7/13/2006 Not reviewed this visit You Were Diagnosed With   
  
 Codes Comments Neck swelling    -  Primary ICD-10-CM: R22.1 ICD-9-CM: 784.2 BMI 31.0-31.9,adult     ICD-10-CM: Z68.31 
ICD-9-CM: V85.31 Vitals BP Pulse Temp Resp Height(growth percentile) Weight(growth percentile) 118/72 (BP 1 Location: Left arm, BP Patient Position: Sitting) 91 97.9 °F (36.6 °C) (Oral) 16 5' 9\" (1.753 m) 211 lb (95.7 kg) LMP SpO2 BMI OB Status Smoking Status 03/05/2018 97% 31.16 kg/m2 Having regular periods Never Smoker BMI and BSA Data Body Mass Index Body Surface Area  
 31.16 kg/m 2 2.16 m 2 Preferred Pharmacy Pharmacy Name Phone [facility], [address] [place] [address] [phone] Your Updated Medication List  
  
   
This list is accurate as of 3/19/18  9:50 AM.  Always use your most recent med list.  
  
  
  
  
 albuterol 90 mcg/actuation inhaler Commonly known as:  PROVENTIL HFA, VENTOLIN HFA, PROAIR HFA Take 2 Puffs by inhalation every four (4) hours as needed for Wheezing. cetirizine 10 mg tablet Commonly known as:  ZYRTEC  
TAKE ONE TABLET BY MOUTH ONE TIME DAILY  
  
 fluticasone 50 mcg/actuation nasal spray Commonly known as:  Marsa Albe 2 Sprays by Both Nostrils route daily. montelukast 10 mg tablet Commonly known as:  SINGULAIR Take 1 Tab by mouth daily. norgestimate-ethinyl estradiol 0.18/0.215/0.25 mg-25 mcg Tab Commonly known as:  TRI-LO-HARRISON Take 1 Tab by mouth daily. PAMPRIN MULTI-SYMPTOM PO Take  by mouth. TYLENOL 325 mg tablet Generic drug:  acetaminophen Take 650 mg by mouth daily as needed for Pain. Follow-up Instructions Return in about 4 months (around 7/19/2018). Patient Instructions Mononucleosis: Care Instructions Your Care Instructions Mononucleosis, also called mono, is an infection that is usually caused by the Hector-Barr virus. Mono is spread through contact with saliva, mucus from the nose and throat, and sometimes tears or blood. You can get mono by kissing a person who is infected. Or you may get it by sharing a drinking glass or eating utensils with someone who has mono. That person may not be sick at the time or may have had mono long before. Mono may cause your spleen to swell. The spleen is an organ in the upper left side of your belly. A blow to the belly can cause a swollen spleen to break open. In very rare cases, the spleen may burst on its own. Most people recover fully after several weeks. But it may take several months before your normal energy is back.  The lymph nodes in your neck may be larger than normal for up to 1 month. Getting lots of rest and keeping your schedule light will help you feel better. Time helps you recover. Follow-up care is a key part of your treatment and safety. Be sure to make and go to all appointments, and call your doctor if you are having problems. It's also a good idea to know your test results and keep a list of the medicines you take. How can you care for yourself at home? · Get plenty of rest. Stay in bed until you feel well enough to be up. · Drink plenty of fluids, enough so that your urine is light yellow or clear like water. If you have kidney, heart, or liver disease and have to limit fluids, talk with your doctor before you increase the amount of fluids you drink. · Take your medicines exactly as prescribed. Call your doctor if you think you are having a problem with your medicine. · For a sore throat, suck on lozenges or gargle with salt water. To make salt water, mix 1 teaspoon of salt in 8 ounces of warm water. · Take an over-the-counter pain medicine, such as acetaminophen (Tylenol), ibuprofen (Advil, Motrin), or naproxen (Aleve) for a sore throat or headache or to lower a fever. Read and follow all instructions on the label. · Do not take two or more pain medicines at the same time unless the doctor told you to. Many pain medicines have acetaminophen, which is Tylenol. Too much acetaminophen (Tylenol) can be harmful. · Do not play contact sports for 4 weeks. Do not lift anything heavy. Too much activity increases the chance that your spleen may break open. · Try not to spread the virus to others. Do not kiss or share dishes, glasses, eating utensils, or toothbrushes for at least two weeks. The virus is spread when saliva from an infected person gets in another person's mouth. It is hard to know how long you may be contagious. · If you know you have mono, do not donate blood. There is a chance of spreading the virus through blood products. When should you call for help? Call 911 anytime you think you may need emergency care. For example, call if: 
? · You passed out (lost consciousness). ?Call your doctor now or seek immediate medical care if: 
? · You have new or worse belly pain. ? · You have signs of needing more fluids. You have sunken eyes and a dry mouth, and you pass only a little urine. ? · You are dizzy or lightheaded, or you feel like you may faint. ? · You cannot swallow fluids. ? Watch closely for changes in your health, and be sure to contact your doctor if: 
? · You do not get better as expected. Where can you learn more? Go to http://mikaela-amaris.info/. Enter K668 in the search box to learn more about \"Mononucleosis: Care Instructions. \" Current as of: March 3, 2017 Content Version: 11.4 © 4973-0036 [employer]. Care instructions adapted under license by [employer] (which disclaims liability or warranty for this information). If you have questions about a medical condition or this instruction, always ask your healthcare professional. [place] [address] any warranty or liability for your use of this information. Learning About Low-Carbohydrate Diets for Weight Loss What is a low-carbohydrate diet? Low-carb diets avoid foods that are high in carbohydrate. These high-carb foods include pasta, bread, rice, cereal, fruits, and starchy vegetables. Instead, these diets usually have you eat foods that are high in fat and protein. Many people lose weight quickly on a low-carb diet. But the early weight loss is water. People on this diet often gain the weight back after they start eating carbs again. Not all diet plans are safe or work well. A lot of the evidence shows that low-carb diets aren't healthy. That's because these diets often don't include healthy foods like fruits and vegetables.  Losing weight safely means balancing protein, fat, and carbs with every meal and snack. And low-carb diets don't always provide the vitamins, minerals, and fiber you need. If you have a serious medical condition, talk to your doctor before you try any diet. These conditions include kidney disease, heart disease, type 2 diabetes, high cholesterol, and high blood pressure. If you are pregnant, it may not be safe for your baby if you are on a low-carb diet. How can you lose weight safely? You might have heard that a diet plan helped another person lose weight. But that doesn't mean that it will work for you. It is very hard to stay on a diet that includes lots of big changes in your eating habits. If you want to get to a healthy weight and stay there, making healthy lifestyle changes will often work better than dieting. These steps can help. · Make a plan for change. Work with your doctor to create a plan that is right for you. · See a dietitian. He or she can show you how to make healthy changes in your eating habits. · Manage stress. If you have a lot of stress in your life, it can be hard to focus on making healthy changes to your daily habits. · Track your food and activity. You are likely to do better at losing weight if you keep track of what you eat and what you do. Follow-up care is a key part of your treatment and safety. Be sure to make and go to all appointments, and call your doctor if you are having problems. It's also a good idea to know your test results and keep a list of the medicines you take. Where can you learn more? Go to http://mikaela-amaris.info/. Enter A121 in the search box to learn more about \"Learning About Low-Carbohydrate Diets for Weight Loss. \" Current as of: May 12, 2017 Content Version: 11.4 © 2697-6029 [employer].  Care instructions adapted under license by [employer] (which disclaims liability or warranty for this information). If you have questions about a medical condition or this instruction, always ask your healthcare professional. [address] any warranty or liability for your use of this information. Learning About Physical Activity What is physical activity? Physical activity is any kind of activity that gets your body moving. The types of physical activity that can help you get fit and stay healthy include: · Aerobic or \"cardio\" activities that make your heart beat faster and make you breathe harder, such as brisk walking, riding a bike, or running. Aerobic activities strengthen your heart and lungs and build up your endurance. · Strength training activities that make your muscles work against, or \"resist,\" something, such as lifting weights or doing push-ups. These activities help tone and strengthen your muscles. · Stretches that allow you to move your joints and muscles through their full range of motion. Stretching helps you be more flexible and avoid injury. What are the benefits of physical activity? Being active is one of the best things you can do to get fit and stay healthy. It helps you to: · Feel stronger and have more energy to do all the things you like to do. · Focus better at school or work and perform better in sports. · Feel, think, and sleep better. · Reach and stay at a healthy weight. · Lose fat and build lean muscle. · Lower your risk for serious health problems. · Keep your bones, muscles, and joints strong. Being fit lets you do more physical activity. And it lets you work out harder without as much effort. How can you make physical activity part of your life? Get at least 30 minutes of exercise on most days of the week. Walking is a good choice. You also may want to do other activities, such as running, swimming, cycling, or playing tennis or team sports.  
Pick activities that you like-ones that make your heart beat faster, your muscles stronger, and your muscles and joints more flexible. If you find more than one thing you like doing, do them all. You don't have to do the same thing every day. Get your heart pumping every day. Any activity that makes your heart beat faster and keeps it at that rate for a while counts. Here are some great ways to get your heart beating faster: · Go for a brisk walk, run, or bike ride. · Go for a hike or swim. · Go in-line skating. · Play a game of touch football, basketball, or soccer. · Ride a bike. · Play tennis or racquetball. · Climb stairs. Even some household chores can be aerobic-just do them at a faster pace. Vacuuming, raking or mowing the lawn, sweeping the garage, and washing and waxing the car all can help get your heart rate up. Strengthen your muscles during the week. You don't have to lift heavy weights or grow big, bulky muscles to get stronger. Doing a few simple activities that make your muscles work against, or \"resist,\" something can help you get stronger. For example, you can: · Do push-ups or sit-ups, which use your own body weight as resistance. · Lift weights or dumbbells or use stretch bands at home or in a gym or community center. Stretch your muscles often. Stretching will help you as you become more active. It can help you stay flexible, loosen tight muscles, and avoid injury. It can also help improve your balance and posture and can be a great way to relax. Be sure to stretch the muscles you'll be using when you work out. It's best to warm your muscles slightly before you stretch them. Walk or do some other light aerobic activity for a few minutes, and then start stretching. When you stretch your muscles: · Do it slowly. Stretching is not about going fast or making sudden movements. · Don't push or bounce during a stretch. · Hold each stretch for at least 15 to 30 seconds, if you can. You should feel a stretch in the muscle, but not pain. · Breathe out as you do the stretch. Then breathe in as you hold the stretch. Don't hold your breath. If you're worried about how more activity might affect your health, have a checkup before you start. Follow any special advice your doctor gives you for getting a smart start. Where can you learn more? Go to http://mikaela-amaris.info/. Enter I302 in the search box to learn more about \"Learning About Physical Activity. \" Current as of: March 13, 2017 Content Version: 11.4 © 2663-7758 [employer]. Care instructions adapted under license by [employer] (which disclaims liability or warranty for this information). If you have questions about a medical condition or this instruction, always ask your healthcare professional. [address] any warranty or liability for your use of this information. Introducing [facility]! Dear Gilberto Miles: 
Thank you for requesting a Punchbowl account. Our records indicate that you already have an active Punchbowl account. You can access your account anytime at [email or web address] Did you know that you can access your hospital and ER discharge instructions at any time in Punchbowl? You can also review all of your test results from your hospital stay or ER visit. Additional Information If you have questions, please visit the Frequently Asked Questions section of the Punchbowl website at [email or web address] Remember, Punchbowl is NOT to be used for urgent needs. For medical emergencies, dial 911. Now available from your iPhone and Android! Please provide this summary of care documentation to your next provider. Your primary care clinician is listed as Jose Luis Bolaños. If you have any questions after today's visit, please call [phone].

## 2018-03-19 NOTE — PATIENT INSTRUCTIONS
Mononucleosis: Care Instructions  Your Care Instructions    Mononucleosis, also called mono, is an infection that is usually caused by the Hector-Barr virus. Mono is spread through contact with saliva, mucus from the nose and throat, and sometimes tears or blood. You can get mono by kissing a person who is infected. Or you may get it by sharing a drinking glass or eating utensils with someone who has mono. That person may not be sick at the time or may have had mono long before. Mono may cause your spleen to swell. The spleen is an organ in the upper left side of your belly. A blow to the belly can cause a swollen spleen to break open. In very rare cases, the spleen may burst on its own. Most people recover fully after several weeks. But it may take several months before your normal energy is back. The lymph nodes in your neck may be larger than normal for up to 1 month. Getting lots of rest and keeping your schedule light will help you feel better. Time helps you recover. Follow-up care is a key part of your treatment and safety. Be sure to make and go to all appointments, and call your doctor if you are having problems. It's also a good idea to know your test results and keep a list of the medicines you take. How can you care for yourself at home? · Get plenty of rest. Stay in bed until you feel well enough to be up. · Drink plenty of fluids, enough so that your urine is light yellow or clear like water. If you have kidney, heart, or liver disease and have to limit fluids, talk with your doctor before you increase the amount of fluids you drink. · Take your medicines exactly as prescribed. Call your doctor if you think you are having a problem with your medicine. · For a sore throat, suck on lozenges or gargle with salt water. To make salt water, mix 1 teaspoon of salt in 8 ounces of warm water.   · Take an over-the-counter pain medicine, such as acetaminophen (Tylenol), ibuprofen (Advil, Motrin), or naproxen (Aleve) for a sore throat or headache or to lower a fever. Read and follow all instructions on the label. · Do not take two or more pain medicines at the same time unless the doctor told you to. Many pain medicines have acetaminophen, which is Tylenol. Too much acetaminophen (Tylenol) can be harmful. · Do not play contact sports for 4 weeks. Do not lift anything heavy. Too much activity increases the chance that your spleen may break open. · Try not to spread the virus to others. Do not kiss or share dishes, glasses, eating utensils, or toothbrushes for at least two weeks. The virus is spread when saliva from an infected person gets in another person's mouth. It is hard to know how long you may be contagious. · If you know you have mono, do not donate blood. There is a chance of spreading the virus through blood products. When should you call for help? Call 911 anytime you think you may need emergency care. For example, call if:  ? · You passed out (lost consciousness). ?Call your doctor now or seek immediate medical care if:  ? · You have new or worse belly pain. ? · You have signs of needing more fluids. You have sunken eyes and a dry mouth, and you pass only a little urine. ? · You are dizzy or lightheaded, or you feel like you may faint. ? · You cannot swallow fluids. ? Watch closely for changes in your health, and be sure to contact your doctor if:  ? · You do not get better as expected. Where can you learn more? Go to http://mikaela-amaris.info/. Enter F281 in the search box to learn more about \"Mononucleosis: Care Instructions. \"  Current as of: March 3, 2017  Content Version: 11.4  © 8335-9634 [employer]. Care instructions adapted under license by [employer] (which disclaims liability or warranty for this information).  If you have questions about a medical condition or this instruction, always ask your healthcare professional. [employer] disclaims any warranty or liability for your use of this information. Learning About Low-Carbohydrate Diets for Weight Loss  What is a low-carbohydrate diet? Low-carb diets avoid foods that are high in carbohydrate. These high-carb foods include pasta, bread, rice, cereal, fruits, and starchy vegetables. Instead, these diets usually have you eat foods that are high in fat and protein. Many people lose weight quickly on a low-carb diet. But the early weight loss is water. People on this diet often gain the weight back after they start eating carbs again. Not all diet plans are safe or work well. A lot of the evidence shows that low-carb diets aren't healthy. That's because these diets often don't include healthy foods like fruits and vegetables. Losing weight safely means balancing protein, fat, and carbs with every meal and snack. And low-carb diets don't always provide the vitamins, minerals, and fiber you need. If you have a serious medical condition, talk to your doctor before you try any diet. These conditions include kidney disease, heart disease, type 2 diabetes, high cholesterol, and high blood pressure. If you are pregnant, it may not be safe for your baby if you are on a low-carb diet. How can you lose weight safely? You might have heard that a diet plan helped another person lose weight. But that doesn't mean that it will work for you. It is very hard to stay on a diet that includes lots of big changes in your eating habits. If you want to get to a healthy weight and stay there, making healthy lifestyle changes will often work better than dieting. These steps can help. · Make a plan for change. Work with your doctor to create a plan that is right for you. · See a dietitian. He or she can show you how to make healthy changes in your eating habits. · Manage stress.  If you have a lot of stress in your life, it can be hard to focus on making healthy changes to your daily habits. · Track your food and activity. You are likely to do better at losing weight if you keep track of what you eat and what you do. Follow-up care is a key part of your treatment and safety. Be sure to make and go to all appointments, and call your doctor if you are having problems. It's also a good idea to know your test results and keep a list of the medicines you take. Where can you learn more? Go to http://mikaela-amaris.info/. Enter A121 in the search box to learn more about \"Learning About Low-Carbohydrate Diets for Weight Loss. \"  Current as of: May 12, 2017  Content Version: 11.4  © 0737-0026 [employer]. Care instructions adapted under license by [employer] (which disclaims liability or warranty for this information). If you have questions about a medical condition or this instruction, always ask your healthcare professional. [address] any warranty or liability for your use of this information. Learning About Physical Activity  What is physical activity? Physical activity is any kind of activity that gets your body moving. The types of physical activity that can help you get fit and stay healthy include:  · Aerobic or \"cardio\" activities that make your heart beat faster and make you breathe harder, such as brisk walking, riding a bike, or running. Aerobic activities strengthen your heart and lungs and build up your endurance. · Strength training activities that make your muscles work against, or \"resist,\" something, such as lifting weights or doing push-ups. These activities help tone and strengthen your muscles. · Stretches that allow you to move your joints and muscles through their full range of motion. Stretching helps you be more flexible and avoid injury. What are the benefits of physical activity? Being active is one of the best things you can do to get fit and stay healthy.  It helps you to:  · Feel stronger and have more energy to do all the things you like to do. · Focus better at school or work and perform better in sports. · Feel, think, and sleep better. · Reach and stay at a healthy weight. · Lose fat and build lean muscle. · Lower your risk for serious health problems. · Keep your bones, muscles, and joints strong. Being fit lets you do more physical activity. And it lets you work out harder without as much effort. How can you make physical activity part of your life? Get at least 30 minutes of exercise on most days of the week. Walking is a good choice. You also may want to do other activities, such as running, swimming, cycling, or playing tennis or team sports. Pick activities that you like-ones that make your heart beat faster, your muscles stronger, and your muscles and joints more flexible. If you find more than one thing you like doing, do them all. You don't have to do the same thing every day. Get your heart pumping every day. Any activity that makes your heart beat faster and keeps it at that rate for a while counts. Here are some great ways to get your heart beating faster:  · Go for a brisk walk, run, or bike ride. · Go for a hike or swim. · Go in-line skating. · Play a game of touch football, basketball, or soccer. · Ride a bike. · Play tennis or racquetball. · Climb stairs. Even some household chores can be aerobic-just do them at a faster pace. Vacuuming, raking or mowing the lawn, sweeping the garage, and washing and waxing the car all can help get your heart rate up. Strengthen your muscles during the week. You don't have to lift heavy weights or grow big, bulky muscles to get stronger. Doing a few simple activities that make your muscles work against, or \"resist,\" something can help you get stronger. For example, you can:  · Do push-ups or sit-ups, which use your own body weight as resistance.   · Lift weights or dumbbells or use stretch bands at home or in a gym or community center. Stretch your muscles often. Stretching will help you as you become more active. It can help you stay flexible, loosen tight muscles, and avoid injury. It can also help improve your balance and posture and can be a great way to relax. Be sure to stretch the muscles you'll be using when you work out. It's best to warm your muscles slightly before you stretch them. Walk or do some other light aerobic activity for a few minutes, and then start stretching. When you stretch your muscles:  · Do it slowly. Stretching is not about going fast or making sudden movements. · Don't push or bounce during a stretch. · Hold each stretch for at least 15 to 30 seconds, if you can. You should feel a stretch in the muscle, but not pain. · Breathe out as you do the stretch. Then breathe in as you hold the stretch. Don't hold your breath. If you're worried about how more activity might affect your health, have a checkup before you start. Follow any special advice your doctor gives you for getting a smart start. Where can you learn more? Go to http://mikaela-amaris.info/. Enter L466 in the search box to learn more about \"Learning About Physical Activity. \"  Current as of: March 13, 2017  Content Version: 11.4  © 1972-4720 [employer]. Care instructions adapted under license by [employer] (which disclaims liability or warranty for this information). If you have questions about a medical condition or this instruction, always ask your healthcare professional. [place] [address] any warranty or liability for your use of this information.

## 2018-03-19 NOTE — PROGRESS NOTES
HISTORY OF PRESENT ILLNESS  Sonya Herman is a 21 y.o. female. HPI: Here for follow up on swollen neck glands. Was diagnosed with infectious mononucleosis. Last visit felt swollen neck and done x=ray . Noted edema vs body habitus. She is asymptomatic. No sore throat. No fever. No nausea or vomiting, no strider. No trouble swallowing or chocking. No cough or cold symptoms. No nausea or vomiting, no abdominal pain. No urinary or bowel complains. Today on exam no palpable lymph nodes or neck glands. Visit Vitals    /72 (BP 1 Location: Left arm, BP Patient Position: Sitting)    Pulse 91    Temp 97.9 °F (36.6 °C) (Oral)    Resp 16    Ht 5' 9\" (1.753 m)    Wt 211 lb (95.7 kg)    SpO2 97%    BMI 31.16 kg/m2     Review medication list, vitals, problem list,allergies. ROS: see HPI     Physical Exam   Constitutional: She is oriented to person, place, and time. No distress. HENT:   Neck: no palpable lymph nodes . No palpable salivary glands. Cardiovascular: Normal heart sounds. Abdominal: Soft. She exhibits no mass. There is no tenderness. Neurological: She is oriented to person, place, and time. Psychiatric: Her behavior is normal.       ASSESSMENT and PLAN    ICD-10-CM ICD-9-CM    1. Neck swelling: asymptomatic with pain or palpable salivary gland or lymph nodes. For now will observe. R22.1 784.2    2. BMI 31.0-31.9,adult: noted almost 50 lbs weight gain since 2016. She mentioned that she has been eating more fast food and not exercising. Discussed healthy life style. Diet modification and importance of weight loss and exercise. She understood and agree to do life style modification. Hand out of diet modification and exercise given in AVS.  Z68.31 V85.31    Pt understood and agree with the plan   Review    Follow-up Disposition:  Return in about 4 months (around 7/19/2018).

## 2018-03-19 NOTE — PROGRESS NOTES
1. Have you been to the ER, urgent care clinic since your last visit? Hospitalized since your last visit? No    2. Have you seen or consulted any other health care providers outside of the [address] [place] [address] since your last visit? Include any pap smears or colon screening.  No

## 2018-06-26 ENCOUNTER — OFFICE VISIT (OUTPATIENT)
Dept: FAMILY MEDICINE CLINIC | Age: 23
End: 2018-06-26

## 2018-06-26 ENCOUNTER — HOSPITAL ENCOUNTER (OUTPATIENT)
Dept: LAB | Age: 23
Discharge: HOME | End: 2018-06-26

## 2018-06-26 VITALS
HEIGHT: 69 IN | WEIGHT: 211 LBS | OXYGEN SATURATION: 98 % | SYSTOLIC BLOOD PRESSURE: 116 MMHG | HEART RATE: 85 BPM | RESPIRATION RATE: 16 BRPM | DIASTOLIC BLOOD PRESSURE: 80 MMHG | BODY MASS INDEX: 31.25 KG/M2 | TEMPERATURE: 97.6 F

## 2018-06-26 DIAGNOSIS — Z13.220 SCREENING FOR HYPERLIPIDEMIA: ICD-10-CM

## 2018-06-26 DIAGNOSIS — R22.1 NECK SWELLING: Primary | ICD-10-CM

## 2018-06-26 DIAGNOSIS — Z87.09 H/O INTRINSIC ASTHMA: ICD-10-CM

## 2018-06-26 DIAGNOSIS — Z13.1 SCREENING FOR DIABETES MELLITUS: ICD-10-CM

## 2018-06-26 DIAGNOSIS — R22.1 NECK SWELLING: ICD-10-CM

## 2018-06-26 DIAGNOSIS — Z13.0 SCREENING FOR DEFICIENCY ANEMIA: ICD-10-CM

## 2018-06-26 DIAGNOSIS — R14.0 BLOATING: ICD-10-CM

## 2018-06-26 DIAGNOSIS — H92.01 EARACHE ON RIGHT: ICD-10-CM

## 2018-06-26 LAB — SENTARA SPECIMEN COL,SENBCF: NORMAL

## 2018-06-26 PROCEDURE — 99001 SPECIMEN HANDLING PT-LAB: CPT | Performed by: FAMILY MEDICINE

## 2018-06-26 RX ORDER — MONTELUKAST SODIUM 10 MG/1
10 TABLET ORAL DAILY
Qty: 30 TAB | Refills: 3 | Status: SHIPPED | OUTPATIENT
Start: 2018-06-26 | End: 2018-10-16 | Stop reason: SDUPTHER

## 2018-06-26 RX ORDER — ALBUTEROL SULFATE 90 UG/1
2 AEROSOL, METERED RESPIRATORY (INHALATION)
Qty: 1 INHALER | Refills: 3 | Status: SHIPPED | OUTPATIENT
Start: 2018-06-26 | End: 2021-08-27 | Stop reason: SDUPTHER

## 2018-06-26 NOTE — PROGRESS NOTES
HISTORY OF PRESENT ILLNESS  Douglas Palacios is a 21 y.o. female. HPI: Here for follow up. Had neck swelling and palpable salivary gland last visit. Also was diagnosed with infectious mononucleosis. Neck x-ray showed edema vs normal body habitus. Currently no neck gland swelling or palpable lymph nodes . no fever. No trouble swallowing or chocking episode. Feeling bloating on and off. generalize abdominal discomfort. Feels it is more when she is stressed. Denies any mood changes. No sleep concern. Asymptomatic at this time. Discuss to take probiotic. No nausea or vomiting. No unexplained weight loss. H/o asthma. Stable. Taking zyrtec. No cough or wheezing. Need albuterol refill but denies any increase use of albuterol. Visit Vitals    /80 (BP 1 Location: Left arm, BP Patient Position: Sitting)    Pulse 85    Temp 97.6 °F (36.4 °C) (Oral)    Resp 16    Ht 5' 9\" (1.753 m)    Wt 211 lb (95.7 kg)    SpO2 98%    BMI 31.16 kg/m2     Review medication list, vitals, problem list,allergies. Had rt earache for few days but no pain at this time. No cold or cough. No headache or dizziness. Agree to get preventive labs. No known thyroid abnormality. discussed high BMI. Discussed diet modification, calorie count and exercise. Discussed importance of weight loss. agree to do exercise and life style modification. Diet and exercise hand out given in AVS.   Regular menstrual period. LMp last week. ROS: see HPI     Physical Exam   Constitutional: She is oriented to person, place, and time. No distress. HENT:   Ears: TM intact, normal light reflex bilaterally. No erythema or ear discharge noted. Neck: No thyromegaly present. Cardiovascular: Normal heart sounds. Pulmonary/Chest: No respiratory distress. She has no wheezes. Abdominal: Soft. Bowel sounds are normal. There is no tenderness. Musculoskeletal: She exhibits no edema. Lymphadenopathy:     She has no cervical adenopathy.    Neurological: She is oriented to person, place, and time. Psychiatric: Her behavior is normal. Thought content normal.       ASSESSMENT and PLAN    ICD-10-CM ICD-9-CM    1. Neck swelling: improved. No palpable neck glands or lymph nodes. Probably secondary to infectious mononucleosis. R22.1 784.2 CBC W/O DIFF      METABOLIC PANEL, COMPREHENSIVE   2. H/O intrinsic asthma: stable. For now advised daily singulair and zyrtec as needed. Z87.09 V12.69 montelukast (SINGULAIR) 10 mg tablet   3. BMI 31.0-31.9,adult: discussed high BMI. Discussed diet modification, calorie count and exercise. Discussed importance of weight loss. agree to do exercise and life style modification. Diet and exercise hand out given in AVS.    Z68.31 V85.31 TSH 3RD GENERATION   4. Earache on right: normal exam. She is also asymptomatic. H92.01 388.70    5. Bloating: avoid spicy and fried food. Probiotic daily. If no improvement schedule a sooner appt.  R14.0 787.3    6. Screening for deficiency anemia Z13.0 V78.1    7.  Screening for hyperlipidemia Z13.220 V77.91 LIPID PANEL   8. Screening for diabetes mellitus Z13.1 V77.1 HEMOGLOBIN A1C WITH EAG   Pt understood and agree with the plan     Follow-up Disposition:  Return for for physical .

## 2018-06-26 NOTE — MR AVS SNAPSHOT
[facility] [address] [place] [address] 
[phone] Patient: Pete Welsh MRN: [id number] [facility]:[id number] Visit Information Date & Time Provider Department Dept. Phone Encounter #  
 6/26/2018  9:45 AM Cassandra Trinh, [address] [place] [address] Road [id number] Follow-up Instructions Return for for physical . Upcoming Health Maintenance Date Due Influenza Age 5 to Adult 8/1/2018 PAP AKA CERVICAL CYTOLOGY 2/23/2020 DTaP/Tdap/Td series (7 - Td) 1/23/2027 Allergies as of 6/26/2018  Review Complete On: 6/26/2018 By: Cassandra Trinh MD  
 No Known Allergies Current Immunizations  Reviewed on 1/17/2012 Name Date DTAP Vaccine 3/5/1996, 1995, 1995, 1995 HIB Vaccine 3/5/1996, 1995, 1995, 1995 HPV (9-valent) 1/23/2017 Hepatitis B Vaccine 1995, 1995, 1995 Human Papillomavirus 4/9/2008, 8/24/2007 IPV 1995, 1995, 1995 Influenza Vaccine Split 12/2/2010 MMR Vaccine 3/5/1996 TB Skin Test (PPD) 10/19/2016 TB Skin Test (PPD) Intradermal 10/15/2013 TDAP Vaccine 7/13/2006 Not reviewed this visit You Were Diagnosed With   
  
 Codes Comments Neck swelling    -  Primary ICD-10-CM: R22.1 ICD-9-CM: 784.2 H/O intrinsic asthma     ICD-10-CM: Z87.09 
ICD-9-CM: V12.69 BMI 31.0-31.9,adult     ICD-10-CM: Z68.31 
ICD-9-CM: V85.31 Earache on right     ICD-10-CM: H92.01 
ICD-9-CM: 388.70 Bloating     ICD-10-CM: R14.0 ICD-9-CM: 787.3 Screening for deficiency anemia     ICD-10-CM: Z13.0 ICD-9-CM: V78.1 Screening for hyperlipidemia     ICD-10-CM: Z13.220 ICD-9-CM: V77.91 Screening for diabetes mellitus     ICD-10-CM: Z13.1 ICD-9-CM: V77.1 Vitals BP Pulse Temp Resp Height(growth percentile) Weight(growth percentile) 116/80 (BP 1 Location: Left arm, BP Patient Position: Sitting) 85 97.6 °F (36.4 °C) (Oral) 16 5' 9\" (1.753 m) 211 lb (95.7 kg) SpO2 BMI OB Status Smoking Status 98% 31.16 kg/m2 Having regular periods Never Smoker BMI and BSA Data Body Mass Index Body Surface Area  
 31.16 kg/m 2 2.16 m 2 Preferred Pharmacy Pharmacy Name Phone [address] [phone] Your Updated Medication List  
  
   
This list is accurate as of 6/26/18 10:13 AM.  Always use your most recent med list.  
  
  
  
  
 albuterol 90 mcg/actuation inhaler Commonly known as:  PROVENTIL HFA, VENTOLIN HFA, PROAIR HFA Take 2 Puffs by inhalation every four (4) hours as needed for Wheezing. cetirizine 10 mg tablet Commonly known as:  ZYRTEC  
TAKE ONE TABLET BY MOUTH ONE TIME DAILY  
  
 fluticasone 50 mcg/actuation nasal spray Commonly known as:  Curtistine Paci 2 Sprays by Both Nostrils route daily. montelukast 10 mg tablet Commonly known as:  SINGULAIR Take 1 Tab by mouth daily. norgestimate-ethinyl estradiol 0.18/0.215/0.25 mg-25 mcg Tab Commonly known as:  TRI-LO-HARRISON Take 1 Tab by mouth daily. PAMPRIN MULTI-SYMPTOM PO Take  by mouth. TYLENOL 325 mg tablet Generic drug:  acetaminophen Take 650 mg by mouth daily as needed for Pain. Prescriptions Sent to Pharmacy Refills  
 albuterol (PROVENTIL HFA, VENTOLIN HFA, PROAIR HFA) 90 mcg/actuation inhaler 3 Sig: Take 2 Puffs by inhalation every four (4) hours as needed for Wheezing. Class: Normal  
 Pharmacy: [address] Ph #: [phone] Route: Inhalation  
 montelukast (SINGULAIR) 10 mg tablet 3 Sig: Take 1 Tab by mouth daily. Class: Normal  
 Pharmacy: [address] Ph #: [phone] Route: Oral  
  
Follow-up Instructions Return for for physical . To-Do List   
 06/26/2018 Lab:  CBC W/O DIFF   
  
 06/26/2018 Lab:  HEMOGLOBIN A1C WITH EAG   
  
 06/26/2018 Lab:  LIPID PANEL   
  
 06/26/2018 Lab:  METABOLIC PANEL, COMPREHENSIVE   
  
 06/26/2018 Lab:  TSH 3RD GENERATION Patient Instructions Learning About Low-Carbohydrate Diets for Weight Loss What is a low-carbohydrate diet? Low-carb diets avoid foods that are high in carbohydrate. These high-carb foods include pasta, bread, rice, cereal, fruits, and starchy vegetables. Instead, these diets usually have you eat foods that are high in fat and protein. Many people lose weight quickly on a low-carb diet. But the early weight loss is water. People on this diet often gain the weight back after they start eating carbs again. Not all diet plans are safe or work well. A lot of the evidence shows that low-carb diets aren't healthy. That's because these diets often don't include healthy foods like fruits and vegetables. Losing weight safely means balancing protein, fat, and carbs with every meal and snack. And low-carb diets don't always provide the vitamins, minerals, and fiber you need. If you have a serious medical condition, talk to your doctor before you try any diet. These conditions include kidney disease, heart disease, type 2 diabetes, high cholesterol, and high blood pressure. If you are pregnant, it may not be safe for your baby if you are on a low-carb diet. How can you lose weight safely? You might have heard that a diet plan helped another person lose weight. But that doesn't mean that it will work for you. It is very hard to stay on a diet that includes lots of big changes in your eating habits. If you want to get to a healthy weight and stay there, making healthy lifestyle changes will often work better than dieting. These steps can help. · Make a plan for change.  Work with your doctor to create a plan that is right for you. · See a dietitian. He or she can show you how to make healthy changes in your eating habits. · Manage stress. If you have a lot of stress in your life, it can be hard to focus on making healthy changes to your daily habits. · Track your food and activity. You are likely to do better at losing weight if you keep track of what you eat and what you do. Follow-up care is a key part of your treatment and safety. Be sure to make and go to all appointments, and call your doctor if you are having problems. It's also a good idea to know your test results and keep a list of the medicines you take. Where can you learn more? Go to http://mikaela-amaris.info/. Enter A121 in the search box to learn more about \"Learning About Low-Carbohydrate Diets for Weight Loss. \" Current as of: May 12, 2017 Content Version: 11.4 © 9506-6731 [employer]. Care instructions adapted under license by [employer] (which disclaims liability or warranty for this information). If you have questions about a medical condition or this instruction, always ask your healthcare professional. [place] [address] any warranty or liability for your use of this information. Learning About Physical Activity What is physical activity? Physical activity is any kind of activity that gets your body moving. The types of physical activity that can help you get fit and stay healthy include: · Aerobic or \"cardio\" activities that make your heart beat faster and make you breathe harder, such as brisk walking, riding a bike, or running. Aerobic activities strengthen your heart and lungs and build up your endurance. · Strength training activities that make your muscles work against, or \"resist,\" something, such as lifting weights or doing push-ups. These activities help tone and strengthen your muscles.  
· Stretches that allow you to move your joints and muscles through their full range of motion. Stretching helps you be more flexible and avoid injury. What are the benefits of physical activity? Being active is one of the best things you can do to get fit and stay healthy. It helps you to: · Feel stronger and have more energy to do all the things you like to do. · Focus better at school or work and perform better in sports. · Feel, think, and sleep better. · Reach and stay at a healthy weight. · Lose fat and build lean muscle. · Lower your risk for serious health problems. · Keep your bones, muscles, and joints strong. Being fit lets you do more physical activity. And it lets you work out harder without as much effort. How can you make physical activity part of your life? Get at least 30 minutes of exercise on most days of the week. Walking is a good choice. You also may want to do other activities, such as running, swimming, cycling, or playing tennis or team sports. Pick activities that you like-ones that make your heart beat faster, your muscles stronger, and your muscles and joints more flexible. If you find more than one thing you like doing, do them all. You don't have to do the same thing every day. Get your heart pumping every day. Any activity that makes your heart beat faster and keeps it at that rate for a while counts. Here are some great ways to get your heart beating faster: · Go for a brisk walk, run, or bike ride. · Go for a hike or swim. · Go in-line skating. · Play a game of touch football, basketball, or soccer. · Ride a bike. · Play tennis or racquetball. · Climb stairs. Even some household chores can be aerobic-just do them at a faster pace. Vacuuming, raking or mowing the lawn, sweeping the garage, and washing and waxing the car all can help get your heart rate up. Strengthen your muscles during the week. You don't have to lift heavy weights or grow big, bulky muscles to get stronger.  Doing a few simple activities that make your muscles work against, or \"resist,\" something can help you get stronger. For example, you can: · Do push-ups or sit-ups, which use your own body weight as resistance. · Lift weights or dumbbells or use stretch bands at home or in a gym or community center. Stretch your muscles often. Stretching will help you as you become more active. It can help you stay flexible, loosen tight muscles, and avoid injury. It can also help improve your balance and posture and can be a great way to relax. Be sure to stretch the muscles you'll be using when you work out. It's best to warm your muscles slightly before you stretch them. Walk or do some other light aerobic activity for a few minutes, and then start stretching. When you stretch your muscles: · Do it slowly. Stretching is not about going fast or making sudden movements. · Don't push or bounce during a stretch. · Hold each stretch for at least 15 to 30 seconds, if you can. You should feel a stretch in the muscle, but not pain. · Breathe out as you do the stretch. Then breathe in as you hold the stretch. Don't hold your breath. If you're worried about how more activity might affect your health, have a checkup before you start. Follow any special advice your doctor gives you for getting a smart start. Where can you learn more? Go to http://mikaela-amaris.info/. Enter S516 in the search box to learn more about \"Learning About Physical Activity. \" Current as of: March 13, 2017 Content Version: 11.4 © 8600-7373 [employer]. Care instructions adapted under license by [employer] (which disclaims liability or warranty for this information). If you have questions about a medical condition or this instruction, always ask your healthcare professional. [address] any warranty or liability for your use of this information. Introducing [facility]! Dear Crystal Maher: Thank you for requesting a [employer] account. Our records indicate that you already have an active [employer] account. You can access your account anytime at [email or web address] Did you know that you can access your hospital and ER discharge instructions at any time in [employer]? You can also review all of your test results from your hospital stay or ER visit. Additional Information If you have questions, please visit the Frequently Asked Questions section of the [employer] website at [email or web address] Remember, [employer] is NOT to be used for urgent needs. For medical emergencies, dial 911. Now available from your iPhone and Android! Please provide this summary of care documentation to your next provider. Your primary care clinician is listed as John Bishop. If you have any questions after today's visit, please call [phone].

## 2018-06-26 NOTE — PROGRESS NOTES
Chief Complaint   Patient presents with    Neck Swelling     FU- resolved but states she was having pain, now not hurting at all    Behavioral Problem     ADHD    Ear Pain     hurting up till today     1. Have you been to the ER, urgent care clinic since your last visit? Hospitalized since your last visit? No    2. Have you seen or consulted any other health care providers outside of the [facility] since your last visit? Include any pap smears or colon screening.  No

## 2018-06-26 NOTE — LETTER
NOTIFICATION RETURN TO WORK / SCHOOL 
 
6/26/2018 10:13 AM 
 
Ms. Alex Marrero [address] [facility] [address] To Whom It May Concern: 
 
Alex Marrero is currently under the care of [facility]. If there are questions or concerns please have the patient contact our office. Sincerely, Christy Garcia MD

## 2018-06-26 NOTE — PATIENT INSTRUCTIONS
Learning About Low-Carbohydrate Diets for Weight Loss  What is a low-carbohydrate diet? Low-carb diets avoid foods that are high in carbohydrate. These high-carb foods include pasta, bread, rice, cereal, fruits, and starchy vegetables. Instead, these diets usually have you eat foods that are high in fat and protein. Many people lose weight quickly on a low-carb diet. But the early weight loss is water. People on this diet often gain the weight back after they start eating carbs again. Not all diet plans are safe or work well. A lot of the evidence shows that low-carb diets aren't healthy. That's because these diets often don't include healthy foods like fruits and vegetables. Losing weight safely means balancing protein, fat, and carbs with every meal and snack. And low-carb diets don't always provide the vitamins, minerals, and fiber you need. If you have a serious medical condition, talk to your doctor before you try any diet. These conditions include kidney disease, heart disease, type 2 diabetes, high cholesterol, and high blood pressure. If you are pregnant, it may not be safe for your baby if you are on a low-carb diet. How can you lose weight safely? You might have heard that a diet plan helped another person lose weight. But that doesn't mean that it will work for you. It is very hard to stay on a diet that includes lots of big changes in your eating habits. If you want to get to a healthy weight and stay there, making healthy lifestyle changes will often work better than dieting. These steps can help. · Make a plan for change. Work with your doctor to create a plan that is right for you. · See a dietitian. He or she can show you how to make healthy changes in your eating habits. · Manage stress. If you have a lot of stress in your life, it can be hard to focus on making healthy changes to your daily habits. · Track your food and activity.  You are likely to do better at losing weight if you keep track of what you eat and what you do. Follow-up care is a key part of your treatment and safety. Be sure to make and go to all appointments, and call your doctor if you are having problems. It's also a good idea to know your test results and keep a list of the medicines you take. Where can you learn more? Go to http://mikaela-amaris.info/. Enter A121 in the search box to learn more about \"Learning About Low-Carbohydrate Diets for Weight Loss. \"  Current as of: May 12, 2017  Content Version: 11.4  © 6323-5654 [employer]. Care instructions adapted under license by [facility] (which disclaims liability or warranty for this information). If you have questions about a medical condition or this instruction, always ask your healthcare professional. [address] any warranty or liability for your use of this information. Learning About Physical Activity  What is physical activity? Physical activity is any kind of activity that gets your body moving. The types of physical activity that can help you get fit and stay healthy include:  · Aerobic or \"cardio\" activities that make your heart beat faster and make you breathe harder, such as brisk walking, riding a bike, or running. Aerobic activities strengthen your heart and lungs and build up your endurance. · Strength training activities that make your muscles work against, or \"resist,\" something, such as lifting weights or doing push-ups. These activities help tone and strengthen your muscles. · Stretches that allow you to move your joints and muscles through their full range of motion. Stretching helps you be more flexible and avoid injury. What are the benefits of physical activity? Being active is one of the best things you can do to get fit and stay healthy. It helps you to:  · Feel stronger and have more energy to do all the things you like to do.   · Focus better at school or work and perform better in sports. · Feel, think, and sleep better. · Reach and stay at a healthy weight. · Lose fat and build lean muscle. · Lower your risk for serious health problems. · Keep your bones, muscles, and joints strong. Being fit lets you do more physical activity. And it lets you work out harder without as much effort. How can you make physical activity part of your life? Get at least 30 minutes of exercise on most days of the week. Walking is a good choice. You also may want to do other activities, such as running, swimming, cycling, or playing tennis or team sports. Pick activities that you like-ones that make your heart beat faster, your muscles stronger, and your muscles and joints more flexible. If you find more than one thing you like doing, do them all. You don't have to do the same thing every day. Get your heart pumping every day. Any activity that makes your heart beat faster and keeps it at that rate for a while counts. Here are some great ways to get your heart beating faster:  · Go for a brisk walk, run, or bike ride. · Go for a hike or swim. · Go in-line skating. · Play a game of touch football, basketball, or soccer. · Ride a bike. · Play tennis or racquetball. · Climb stairs. Even some household chores can be aerobic-just do them at a faster pace. Vacuuming, raking or mowing the lawn, sweeping the garage, and washing and waxing the car all can help get your heart rate up. Strengthen your muscles during the week. You don't have to lift heavy weights or grow big, bulky muscles to get stronger. Doing a few simple activities that make your muscles work against, or \"resist,\" something can help you get stronger. For example, you can:  · Do push-ups or sit-ups, which use your own body weight as resistance. · Lift weights or dumbbells or use stretch bands at home or in a gym or community center. Stretch your muscles often. Stretching will help you as you become more active.  It can help you stay flexible, loosen tight muscles, and avoid injury. It can also help improve your balance and posture and can be a great way to relax. Be sure to stretch the muscles you'll be using when you work out. It's best to warm your muscles slightly before you stretch them. Walk or do some other light aerobic activity for a few minutes, and then start stretching. When you stretch your muscles:  · Do it slowly. Stretching is not about going fast or making sudden movements. · Don't push or bounce during a stretch. · Hold each stretch for at least 15 to 30 seconds, if you can. You should feel a stretch in the muscle, but not pain. · Breathe out as you do the stretch. Then breathe in as you hold the stretch. Don't hold your breath. If you're worried about how more activity might affect your health, have a checkup before you start. Follow any special advice your doctor gives you for getting a smart start. Where can you learn more? Go to http://mikaela-amaris.info/. Enter X240 in the search box to learn more about \"Learning About Physical Activity. \"  Current as of: March 13, 2017  Content Version: 11.4  © 3254-1259 [employer]. Care instructions adapted under license by [employer] (which disclaims liability or warranty for this information). If you have questions about a medical condition or this instruction, always ask your healthcare professional. [place] [address] any warranty or liability for your use of this information.

## 2018-06-27 LAB
A-G RATIO,AGRAT: 1.6 RATIO (ref 1.1–2.6)
ALBUMIN SERPL-MCNC: 4.3 G/DL (ref 3.5–5)
ALP SERPL-CCNC: 82 U/L (ref 25–115)
ALT SERPL-CCNC: 10 U/L (ref 5–40)
ANION GAP SERPL CALC-SCNC: 14 MMOL/L
AST SERPL W P-5'-P-CCNC: 10 U/L (ref 10–37)
AVG GLU, 10930: 95 MG/DL (ref 91–123)
BILIRUB SERPL-MCNC: 0.6 MG/DL (ref 0.2–1.2)
BUN SERPL-MCNC: 15 MG/DL (ref 6–22)
CALCIUM SERPL-MCNC: 9.7 MG/DL (ref 8.4–10.5)
CHLORIDE SERPL-SCNC: 101 MMOL/L (ref 98–110)
CHOLEST SERPL-MCNC: 149 MG/DL (ref 110–200)
CO2 SERPL-SCNC: 25 MMOL/L (ref 20–32)
CREAT SERPL-MCNC: 0.8 MG/DL (ref 0.5–1.2)
ERYTHROCYTE [DISTWIDTH] IN BLOOD BY AUTOMATED COUNT: 13.3 % (ref 10–15.5)
GFRAA, 66117: >60
GFRNA, 66118: >60
GLOBULIN,GLOB: 2.7 G/DL (ref 2–4)
GLUCOSE SERPL-MCNC: 98 MG/DL (ref 70–99)
HBA1C MFR BLD HPLC: 5 % (ref 4.8–5.9)
HCT VFR BLD AUTO: 47.9 % (ref 35.1–46.5)
HDLC SERPL-MCNC: 2.8 MG/DL (ref 0–5)
HDLC SERPL-MCNC: 54 MG/DL (ref 40–59)
HGB BLD-MCNC: 15.1 G/DL (ref 11.7–15.5)
LDLC SERPL CALC-MCNC: 77 MG/DL (ref 50–99)
MCH RBC QN AUTO: 30 PG (ref 26–34)
MCHC RBC AUTO-ENTMCNC: 32 G/DL (ref 31–36)
MCV RBC AUTO: 94 FL (ref 80–95)
PLATELET # BLD AUTO: 279 K/UL (ref 140–440)
PMV BLD AUTO: 12.1 FL (ref 9–13)
POTASSIUM SERPL-SCNC: 4.3 MMOL/L (ref 3.5–5.5)
PROT SERPL-MCNC: 7 G/DL (ref 6.4–8.3)
RBC # BLD AUTO: 5.08 M/UL (ref 3.8–5.2)
SODIUM SERPL-SCNC: 140 MMOL/L (ref 133–145)
TRIGL SERPL-MCNC: 90 MG/DL (ref 40–149)
TSH SERPL DL<=0.005 MIU/L-ACNC: 1.15 MCU/ML (ref 0.27–4.2)
VLDLC SERPL CALC-MCNC: 18 MG/DL (ref 8–30)
WBC # BLD AUTO: 6.9 K/UL (ref 4–11)

## 2018-07-12 NOTE — PROGRESS NOTES
Contacted patient and verified identity using name and date of birth (2- identifiers)  Spoke with patient and she verbalized understanding that labs are ok.  Further discussion on follow-up

## 2018-10-16 DIAGNOSIS — Z87.09 H/O INTRINSIC ASTHMA: ICD-10-CM

## 2018-10-16 RX ORDER — MONTELUKAST SODIUM 10 MG/1
TABLET ORAL
Qty: 30 TAB | Refills: 0 | Status: SHIPPED | OUTPATIENT
Start: 2018-10-16 | End: 2018-11-14 | Stop reason: SDUPTHER

## 2018-10-16 NOTE — LETTER
10/31/2018 10:15 AM 
 
Ms. Ricarda Reich [address] [facility] [address] Dear Ms. Gale Neal: 
 
Nikki Bashir missed you! Please call our office at [phone] and schedule a follow up appointment for your continued care and future prescription refills. Sincerely, Inna Roldan MD

## 2018-10-31 NOTE — TELEPHONE ENCOUNTER
Called patient and left message to please call office to schedule follow-up appointment.  Will send letter (#1)

## 2018-11-14 DIAGNOSIS — Z87.09 H/O INTRINSIC ASTHMA: ICD-10-CM

## 2018-11-27 RX ORDER — MONTELUKAST SODIUM 10 MG/1
TABLET ORAL
Qty: 30 TAB | Refills: 0 | Status: SHIPPED | OUTPATIENT
Start: 2018-11-27 | End: 2018-12-26 | Stop reason: SDUPTHER

## 2018-12-26 DIAGNOSIS — Z87.09 H/O INTRINSIC ASTHMA: ICD-10-CM

## 2019-01-03 RX ORDER — MONTELUKAST SODIUM 10 MG/1
TABLET ORAL
Qty: 30 TAB | Refills: 0 | Status: SHIPPED | OUTPATIENT
Start: 2019-01-03 | End: 2019-02-06 | Stop reason: SDUPTHER

## 2019-02-06 DIAGNOSIS — Z87.09 H/O INTRINSIC ASTHMA: ICD-10-CM

## 2019-02-07 RX ORDER — MONTELUKAST SODIUM 10 MG/1
TABLET ORAL
Qty: 30 TAB | Refills: 0 | Status: SHIPPED | OUTPATIENT
Start: 2019-02-07 | End: 2019-03-17 | Stop reason: SDUPTHER

## 2019-03-17 DIAGNOSIS — Z87.09 H/O INTRINSIC ASTHMA: ICD-10-CM

## 2019-03-18 RX ORDER — MONTELUKAST SODIUM 10 MG/1
TABLET ORAL
Qty: 30 TAB | Refills: 0 | Status: SHIPPED | OUTPATIENT
Start: 2019-03-18 | End: 2019-04-18 | Stop reason: SDUPTHER

## 2019-04-18 DIAGNOSIS — Z87.09 H/O INTRINSIC ASTHMA: ICD-10-CM

## 2019-04-18 RX ORDER — MONTELUKAST SODIUM 10 MG/1
TABLET ORAL
Qty: 30 TAB | Refills: 0 | Status: SHIPPED | OUTPATIENT
Start: 2019-04-18 | End: 2019-05-06 | Stop reason: SDUPTHER

## 2019-05-06 DIAGNOSIS — Z87.09 H/O INTRINSIC ASTHMA: ICD-10-CM

## 2019-05-06 NOTE — TELEPHONE ENCOUNTER
This pharmacy faxed over request for the following prescriptions to be filled:    Medication requested :   Requested Prescriptions     Pending Prescriptions Disp Refills    montelukast (SINGULAIR) 10 mg tablet 30 Tab 0     PCP: [id number] or Print: [facility]  Mail order or Local pharmacy [facility]     Scheduled appointment if not seen by current providers in office:  LOV 6/26/2018 No f/u up Scheduled at this time. Due now for CPE.  MAGDIEL to schedule

## 2019-05-07 RX ORDER — MONTELUKAST SODIUM 10 MG/1
10 TABLET ORAL DAILY
Qty: 30 TAB | Refills: 0 | Status: SHIPPED | OUTPATIENT
Start: 2019-05-07

## 2019-07-06 DIAGNOSIS — Z87.09 H/O INTRINSIC ASTHMA: ICD-10-CM

## 2019-07-07 RX ORDER — MONTELUKAST SODIUM 10 MG/1
TABLET ORAL
Qty: 30 TAB | Refills: 0 | OUTPATIENT
Start: 2019-07-07

## 2019-07-07 NOTE — TELEPHONE ENCOUNTER
Patient hasn't been seen in over a year. Please contact her to schedule f/u with Dr. Kolton Israel. I can send enough refills to bridge until her appt once scheduled.

## 2020-05-18 ENCOUNTER — VIRTUAL VISIT (OUTPATIENT)
Dept: FAMILY MEDICINE CLINIC | Age: 25
End: 2020-05-18

## 2020-05-18 DIAGNOSIS — K21.9 GASTROESOPHAGEAL REFLUX DISEASE WITHOUT ESOPHAGITIS: Primary | ICD-10-CM

## 2020-05-18 DIAGNOSIS — N92.0 MENORRHAGIA WITH REGULAR CYCLE: ICD-10-CM

## 2020-05-18 RX ORDER — NORGESTIMATE AND ETHINYL ESTRADIOL 7DAYSX3 LO
1 KIT ORAL DAILY
Qty: 3 DOSE PACK | Refills: 0 | Status: SHIPPED | OUTPATIENT
Start: 2020-05-18 | End: 2020-07-08

## 2020-05-18 RX ORDER — PANTOPRAZOLE SODIUM 20 MG/1
20 TABLET, DELAYED RELEASE ORAL DAILY
Qty: 30 TAB | Refills: 1 | Status: SHIPPED | OUTPATIENT
Start: 2020-05-18 | End: 2020-06-15

## 2020-05-18 NOTE — PROGRESS NOTES
Tarah Smith is a 22 y.o. female who was seen by synchronous (real-time) audio-video technology on 5/18/2020. Consent: Tarah Smith, who was seen by synchronous (real-time) audio-video technology, and/or her healthcare decision maker, is aware that this patient-initiated, Telehealth encounter on 5/18/2020 is a billable service, with coverage as determined by her insurance carrier. She is aware that she may receive a bill and has provided verbal consent to proceed: Yes. Assessment & Plan:   Diagnoses and all orders for this visit:    Gastroesophageal reflux disease without esophagitis: Given Protonix. She has been symptomatic almost every day. Currently not taking any medicine. Advised her to take the medicine 30 minutes before the meal time and diet modification. Next  -     pantoprazole (PROTONIX) 20 mg tablet; Take 1 Tab by mouth daily. , Normal, Disp-30 Tab, R-1    Menorrhagia with regular cycle: Been on birth control. Said she is taking it with compliance and same time. Also discussed the implant and she will think about it as she does want to switch oral birth control. Discussed that if she decide we can do the OB/GYN referral.  We will follow-up next visit. Medication refill been given. -     norgestimate-ethinyl estradioL (Tri-Lo-Norma) 0.18/0.215/0.25 mg-25 mcg tab; Take 1 Tab by mouth daily. , Normal, Disp-3 Dose Pack, R-0      Patient understood and agree with above plan. Review health maintenance. Follow-up in a month and advised her to schedule it for physical.  She is due for Pap and next visit will be physical to completed. Patient understood and agree with above plan  Subjective:   Tarah Smith is a 22 y.o. female who was seen for Follow-up  Done visit with [facility]. Not seen patient since 2018. Currently on oral birth control pills. Said she been having breakthrough bleeding in between the menstrual cycle.   She been saying that she is taking medication with compliance and every day same time. Last.  Was last Wednesday. sexually active and using barrier contraceptive. Complain of GERD symptoms. Said feeling acid reflux almost every day. Mood related to food. No abdominal pain or nausea or vomiting. Not taking any medication. During virtual visit she was sitting comfortable did not appear in any acute distress. Denies any headache, dizziness, no chest pain or trouble breathing, no arm or leg weakness. No nausea or vomiting, no weight or appetite changes, no mood changes . No urine or bowel complains, no palpitation, no diaphoresis. No abdominal pain. No cold or cough. No leg swelling. No fever. No sleep trouble. Denies any unusual fatigue. Lab Results   Component Value Date/Time    WBC 6.9 06/26/2018 10:56 AM    HGB 15.1 06/26/2018 10:56 AM    HCT 47.9 (H) 06/26/2018 10:56 AM    PLATELET 559 32/08/6521 10:56 AM    MCV 94 06/26/2018 10:56 AM     Lab Results   Component Value Date/Time    Sodium 140 06/26/2018 10:56 AM    Potassium 4.3 06/26/2018 10:56 AM    Chloride 101 06/26/2018 10:56 AM    CO2 25 06/26/2018 10:56 AM    Anion gap 14.0 06/26/2018 10:56 AM    Glucose 98 06/26/2018 10:56 AM    BUN 15 06/26/2018 10:56 AM    Creatinine 0.8 06/26/2018 10:56 AM    BUN/Creatinine ratio 11 (L) 01/05/2014 01:00 PM    GFR est AA >60 01/05/2014 01:00 PM    GFR est non-AA >60 01/05/2014 01:00 PM    Calcium 9.7 06/26/2018 10:56 AM    Bilirubin, total 0.6 06/26/2018 10:56 AM    AST (SGOT) 10 06/26/2018 10:56 AM    Alk.  phosphatase 82 06/26/2018 10:56 AM    Protein, total 7.0 06/26/2018 10:56 AM    Albumin 4.3 06/26/2018 10:56 AM    Globulin 2.7 06/26/2018 10:56 AM    A-G Ratio 1.6 06/26/2018 10:56 AM    ALT (SGPT) 10 06/26/2018 10:56 AM     Lab Results   Component Value Date/Time    Cholesterol, total 149 06/26/2018 10:56 AM    HDL Cholesterol 54 06/26/2018 10:56 AM    LDL, calculated 77 06/26/2018 10:56 AM    VLDL, calculated 18 06/26/2018 10:56 AM Triglyceride 90 06/26/2018 10:56 AM     Lab Results   Component Value Date/Time    TSH 1.15 06/26/2018 10:56 AM     Lab Results   Component Value Date/Time    Hemoglobin A1c 5.0 06/26/2018 10:56 AM             Prior to Admission medications    Medication Sig Start Date End Date Taking? Authorizing Provider   norgestimate-ethinyl estradioL (Tri-Lo-Norma) 0.18/0.215/0.25 mg-25 mcg tab Take 1 Tab by mouth daily. 5/18/20  Yes Samia Chen MD   pantoprazole (PROTONIX) 20 mg tablet Take 1 Tab by mouth daily. 5/18/20  Yes Samia Chen MD   montelukast (SINGULAIR) 10 mg tablet Take 1 Tab by mouth daily. 5/7/19  Yes Samia Chen MD   albuterol (PROVENTIL HFA, VENTOLIN HFA, PROAIR HFA) 90 mcg/actuation inhaler Take 2 Puffs by inhalation every four (4) hours as needed for Wheezing. 6/26/18  Yes Samia Chen MD   cetirizine (ZYRTEC) 10 mg tablet TAKE ONE TABLET BY MOUTH ONE TIME DAILY 2/15/14  Yes Angelo Cary MD   fluticasone [facility]) 50 mcg/actuation nasal spray 2 Sprays by Both Nostrils route daily. 2/12/13  Yes Analy Ramos MD   ACETAMINOPHEN/PYRILAM/PAMABROM (PAMPRIN MULTI-SYMPTOM PO) Take  by mouth. Yes Provider, Historical   Tri-Lo-Norma 0.18/0.215/0.25 mg-25 mcg tab TAKE 1 TABLET BY MOUTH EVERY DAY 4/3/20 5/18/20  Samia Chen MD   acetaminophen (TYLENOL) 325 mg tablet Take 650 mg by mouth daily as needed for Pain. Provider, Historical     No Known Allergies    Patient Active Problem List    Diagnosis Date Noted    H/O intrinsic asthma 06/26/2018    Menorrhagia with regular cycle 01/29/2018    Focal nodular hyperplasia of liver     ADHD (attention deficit hyperactivity disorder) 12/02/2010    Back pain 12/02/2010     Current Outpatient Medications   Medication Sig Dispense Refill    norgestimate-ethinyl estradioL (Tri-Lo-Norma) 0.18/0.215/0.25 mg-25 mcg tab Take 1 Tab by mouth daily. 3 Dose Pack 0    pantoprazole (PROTONIX) 20 mg tablet Take 1 Tab by mouth daily.  30 Tab 1    montelukast (SINGULAIR) 10 mg tablet Take 1 Tab by mouth daily. 30 Tab 0    albuterol (PROVENTIL HFA, VENTOLIN HFA, PROAIR HFA) 90 mcg/actuation inhaler Take 2 Puffs by inhalation every four (4) hours as needed for Wheezing. 1 Inhaler 3    cetirizine (ZYRTEC) 10 mg tablet TAKE ONE TABLET BY MOUTH ONE TIME DAILY 90 Tab 0    fluticasone (FLONASE) 50 mcg/actuation nasal spray 2 Sprays by Both Nostrils route daily. 1 Bottle 2    ACETAMINOPHEN/PYRILAM/PAMABROM (PAMPRIN MULTI-SYMPTOM PO) Take  by mouth.  acetaminophen (TYLENOL) 325 mg tablet Take 650 mg by mouth daily as needed for Pain. No Known Allergies  Past Medical History:   Diagnosis Date    ADHD     weaned off on meds, per pt doing well    AR (allergic rhinitis)     Asthma     Dyslexia     Focal nodular hyperplasia of liver     recommend MRI repeat 7/2014    Kidney stone     RAD (reactive airway disease)      No past surgical history on file. ROS: See HPI review prior labs. Objective: There were no vitals taken for this visit. General: alert, cooperative, no distress   Mental  status: normal mood, behavior, speech, dress, motor activity, and thought processes, able to follow commands   HENT: NCAT   Neck: no visualized mass   Resp: no respiratory distress   Neuro: no gross deficits   Skin: no discoloration or lesions of concern on visible areas   Psychiatric: normal affect, consistent with stated mood, no evidence of hallucinations     Additional exam findings: We discussed the expected course, resolution and complications of the diagnosis(es) in detail. Medication risks, benefits, costs, interactions, and alternatives were discussed as indicated. I advised her to contact the office if her condition worsens, changes or fails to improve as anticipated. She expressed understanding with the diagnosis(es) and plan. Adela Iqbal is a 22 y.o. female who was evaluated by a video visit encounter for concerns as above. Patient identification was verified prior to start of the visit. A caregiver was present when appropriate. Due to this being a TeleHealth encounter (During [facility] public health emergency), evaluation of the following organ systems was limited: Vitals/Constitutional/EENT/Resp/CV/GI//MS/Neuro/Skin/Heme-Lymph-Imm. Pursuant to the emergency declaration under the [address] [place] [address], [facility] waiver authority and the [employer] Act, this Virtual  Visit was conducted, with patient's (and/or legal guardian's) consent, to reduce the patient's risk of exposure to COVID-19 and provide necessary medical care. Services were provided through a video synchronous discussion virtually to substitute for in-person clinic visit. Patient and provider were located at their individual homes.       Lavern Wu MD

## 2020-09-05 DIAGNOSIS — K21.9 GASTROESOPHAGEAL REFLUX DISEASE WITHOUT ESOPHAGITIS: ICD-10-CM

## 2020-09-05 NOTE — LETTER
9/23/2020 11:02 AM 
 
Ms. Alejandra Baltazar [phone] [address] [address] Dear Ms. Sims Claude: 
 
Connie Titus missed you! Please call our office at [phone] and schedule a follow up appointment for your continued care. Sincerely, Qi Mcfarlane MD

## 2020-09-07 RX ORDER — PANTOPRAZOLE SODIUM 20 MG/1
TABLET, DELAYED RELEASE ORAL
Qty: 90 TAB | Refills: 0 | Status: SHIPPED | OUTPATIENT
Start: 2020-09-07 | End: 2020-10-28

## 2020-10-27 DIAGNOSIS — K21.9 GASTROESOPHAGEAL REFLUX DISEASE WITHOUT ESOPHAGITIS: ICD-10-CM

## 2020-10-27 NOTE — LETTER
12/16/2020 8:27 AM 
 
Ms. Vitaliy Montiel [id number] [address] [address] Dear Ms. Ángela Ocasio: 
 
Brittany Serrano missed you! Please call our office at [phone] and schedule a follow up appointment for your continued care. Sincerely, Yolanda Smith MD

## 2020-10-28 RX ORDER — PANTOPRAZOLE SODIUM 20 MG/1
TABLET, DELAYED RELEASE ORAL
Qty: 90 TAB | Refills: 0 | Status: SHIPPED | OUTPATIENT
Start: 2020-10-28

## 2020-11-11 NOTE — TELEPHONE ENCOUNTER
Attempted to contact patient [phone] (home) . There was no answer and VM box full. [facility] message sent.

## 2020-12-16 DIAGNOSIS — N92.0 MENORRHAGIA WITH REGULAR CYCLE: ICD-10-CM

## 2020-12-16 RX ORDER — NORGESTIMATE AND ETHINYL ESTRADIOL 7DAYSX3 LO
KIT ORAL
Qty: 28 TAB | Refills: 5 | Status: SHIPPED | OUTPATIENT
Start: 2020-12-16 | End: 2021-05-14

## 2021-06-08 DIAGNOSIS — N92.0 MENORRHAGIA WITH REGULAR CYCLE: ICD-10-CM

## 2021-06-09 RX ORDER — NORGESTIMATE AND ETHINYL ESTRADIOL 7DAYSX3 LO
KIT ORAL
Qty: 28 TABLET | Refills: 1 | Status: SHIPPED | OUTPATIENT
Start: 2021-06-09 | End: 2021-07-12

## 2021-08-27 ENCOUNTER — OFFICE VISIT (OUTPATIENT)
Dept: FAMILY MEDICINE CLINIC | Age: 26
End: 2021-08-27
Payer: MEDICAID

## 2021-08-27 ENCOUNTER — HOSPITAL ENCOUNTER (OUTPATIENT)
Dept: LAB | Age: 26
Discharge: HOME | End: 2021-08-27

## 2021-08-27 VITALS
HEART RATE: 62 BPM | SYSTOLIC BLOOD PRESSURE: 98 MMHG | BODY MASS INDEX: 29.39 KG/M2 | WEIGHT: 199 LBS | TEMPERATURE: 97.7 F | DIASTOLIC BLOOD PRESSURE: 68 MMHG

## 2021-08-27 DIAGNOSIS — Z01.419 WELL WOMAN EXAM WITH ROUTINE GYNECOLOGICAL EXAM: Primary | ICD-10-CM

## 2021-08-27 DIAGNOSIS — Z30.8 ENCOUNTER FOR OTHER CONTRACEPTIVE MANAGEMENT: ICD-10-CM

## 2021-08-27 DIAGNOSIS — F40.10 SOCIAL ANXIETY DISORDER: ICD-10-CM

## 2021-08-27 DIAGNOSIS — J30.1 SEASONAL ALLERGIC RHINITIS DUE TO POLLEN: ICD-10-CM

## 2021-08-27 DIAGNOSIS — K21.9 GASTROESOPHAGEAL REFLUX DISEASE WITHOUT ESOPHAGITIS: ICD-10-CM

## 2021-08-27 DIAGNOSIS — F41.9 ANXIETY: ICD-10-CM

## 2021-08-27 DIAGNOSIS — Z12.4 SCREENING FOR MALIGNANT NEOPLASM OF CERVIX: ICD-10-CM

## 2021-08-27 LAB — XX-LABCORP SPECIMEN COL,LCBCF: NORMAL

## 2021-08-27 PROCEDURE — 99395 PREV VISIT EST AGE 18-39: CPT | Performed by: FAMILY MEDICINE

## 2021-08-27 PROCEDURE — 99214 OFFICE O/P EST MOD 30 MIN: CPT | Performed by: FAMILY MEDICINE

## 2021-08-27 PROCEDURE — 99001 SPECIMEN HANDLING PT-LAB: CPT

## 2021-08-27 RX ORDER — ESCITALOPRAM OXALATE 10 MG/1
10 TABLET ORAL DAILY
Qty: 30 TABLET | Refills: 1 | Status: SHIPPED | OUTPATIENT
Start: 2021-08-27 | End: 2021-09-21 | Stop reason: SDUPTHER

## 2021-08-27 RX ORDER — ALBUTEROL SULFATE 90 UG/1
2 AEROSOL, METERED RESPIRATORY (INHALATION)
Qty: 1 INHALER | Refills: 3 | Status: SHIPPED | OUTPATIENT
Start: 2021-08-27

## 2021-08-27 NOTE — PATIENT INSTRUCTIONS
Well Visit, Ages 25 to 48: Care Instructions  Overview     Well visits can help you stay healthy. Your doctor has checked your overall health and may have suggested ways to take good care of yourself. Your doctor also may have recommended tests. At home, you can help prevent illness with healthy eating, regular exercise, and other steps. Follow-up care is a key part of your treatment and safety. Be sure to make and go to all appointments, and call your doctor if you are having problems. It's also a good idea to know your test results and keep a list of the medicines you take. How can you care for yourself at home? · Get screening tests that you and your doctor decide on. Screening helps find diseases before any symptoms appear. · Eat healthy foods. Choose fruits, vegetables, whole grains, protein, and low-fat dairy foods. Limit fat, especially saturated fat. Reduce salt in your diet. · Limit alcohol. If you are a man, have no more than 2 drinks a day or 14 drinks a week. If you are a woman, have no more than 1 drink a day or 7 drinks a week. · Get at least 30 minutes of physical activity on most days of the week. Walking is a good choice. You also may want to do other activities, such as running, swimming, cycling, or playing tennis or team sports. Discuss any changes in your exercise program with your doctor. · Reach and stay at a healthy weight. This will lower your risk for many problems, such as obesity, diabetes, heart disease, and high blood pressure. · Do not smoke or allow others to smoke around you. If you need help quitting, talk to your doctor about stop-smoking programs and medicines. These can increase your chances of quitting for good. · Care for your mental health. It is easy to get weighed down by worry and stress. Learn strategies to manage stress, like deep breathing and mindfulness, and stay connected with your family and community.  If you find you often feel sad or hopeless, talk with your doctor. Treatment can help. · Talk to your doctor about whether you have any risk factors for sexually transmitted infections (STIs). You can help prevent STIs if you wait to have sex with a new partner (or partners) until you've each been tested for STIs. It also helps if you use condoms (male or female condoms) and if you limit your sex partners to one person who only has sex with you. Vaccines are available for some STIs, such as HPV. · Use birth control if it's important to you to prevent pregnancy. Talk with your doctor about the choices available and what might be best for you. · If you think you may have a problem with alcohol or drug use, talk to your doctor. This includes prescription medicines (such as amphetamines and opioids) and illegal drugs (such as cocaine and methamphetamine). Your doctor can help you figure out what type of treatment is best for you. · Protect your skin from too much sun. When you're outdoors from 10 a.m. to 4 p.m., stay in the shade or cover up with clothing and a hat with a wide brim. Wear sunglasses that block UV rays. Even when it's cloudy, put broad-spectrum sunscreen (SPF 30 or higher) on any exposed skin. · See a dentist one or two times a year for checkups and to have your teeth cleaned. · Wear a seat belt in the car. When should you call for help? Watch closely for changes in your health, and be sure to contact your doctor if you have any problems or symptoms that concern you. Where can you learn more? Go to http://www.[employer].com/  Enter P072 in the search box to learn more about \"Well Visit, Ages 25 to 48: Care Instructions. \"  Current as of: May 27, 2020               Content Version: 12.8  © 6082-3093 Healthwise, Incorporated. Care instructions adapted under license by [employer] (which disclaims liability or warranty for this information).  If you have questions about a medical condition or this instruction, always ask your healthcare professional. [place] [address] any warranty or liability for your use of this information. Breast Self-Exam: Care Instructions  Your Care Instructions     A breast self-exam is when you check your breasts for lumps or changes. This regular exam helps you learn how your breasts normally look and feel. Most breast problems or changes are not because of cancer. Breast self-exam is not a substitute for a mammogram. Having regular breast exams by your doctor and regular mammograms improve your chances of finding any problems with your breasts. Some women set a time each month to do a step-by-step breast self-exam. Other women like a less formal system. They might look at their breasts as they brush their teeth, or feel their breasts once in a while in the shower. If you notice a change in your breast, tell your doctor. Follow-up care is a key part of your treatment and safety. Be sure to make and go to all appointments, and call your doctor if you are having problems. It's also a good idea to know your test results and keep a list of the medicines you take. How do you do a breast self-exam?  · The best time to examine your breasts is usually one week after your menstrual period begins. Your breasts should not be tender then. If you do not have periods, you might do your exam on a day of the month that is easy to remember. · To examine your breasts:  ? Remove all your clothes above the waist and lie down. When you are lying down, your breast tissue spreads evenly over your chest wall, which makes it easier to feel all your breast tissue. ? Use the pads--not the fingertips--of the 3 middle fingers of your left hand to check your right breast. Move your fingers slowly in small coin-sized circles that overlap. ? Use three levels of pressure to feel of all your breast tissue. Use light pressure to feel the tissue close to the skin surface.  Use medium pressure to feel a little deeper. Use firm pressure to feel your tissue close to your breastbone and ribs. Use each pressure level to feel your breast tissue before moving on to the next spot. ? Check your entire breast, moving up and down as if following a strip from the collarbone to the bra line, and from the armpit to the ribs. Repeat until you have covered the entire breast.  ? Repeat this procedure for your left breast, using the pads of the 3 middle fingers of your right hand. · To examine your breasts while in the shower:  ? Place one arm over your head and lightly soap your breast on that side. ? Using the pads of your fingers, gently move your hand over your breast (in the strip pattern described above), feeling carefully for any lumps or changes. ? Repeat for the other breast.  · Have your doctor inspect anything you notice to see if you need further testing. Where can you learn more? Go to http://www.gray.com/  Enter P148 in the search box to learn more about \"Breast Self-Exam: Care Instructions. \"  Current as of: December 17, 2020               Content Version: 12.8  © 5064-3214 [employer]. Care instructions adapted under license by [employer] (which disclaims liability or warranty for this information). If you have questions about a medical condition or this instruction, always ask your healthcare professional. [address] any warranty or liability for your use of this information. A Healthy Lifestyle: Care Instructions  Your Care Instructions     A healthy lifestyle can help you feel good, stay at a healthy weight, and have plenty of energy for both work and play. A healthy lifestyle is something you can share with your whole family. A healthy lifestyle also can lower your risk for serious health problems, such as high blood pressure, heart disease, and diabetes.   You can follow a few steps listed below to improve your health and the health of your family. Follow-up care is a key part of your treatment and safety. Be sure to make and go to all appointments, and call your doctor if you are having problems. It's also a good idea to know your test results and keep a list of the medicines you take. How can you care for yourself at home? · Do not eat too much sugar, fat, or fast foods. You can still have dessert and treats now and then. The goal is moderation. · Start small to improve your eating habits. Pay attention to portion sizes, drink less juice and soda pop, and eat more fruits and vegetables. ? Eat a healthy amount of food. A 3-ounce serving of meat, for example, is about the size of a deck of cards. Fill the rest of your plate with vegetables and whole grains. ? Limit the amount of soda and sports drinks you have every day. Drink more water when you are thirsty. ? Eat plenty of fruits and vegetables every day. Have an apple or some carrot sticks as an afternoon snack instead of a candy bar. Try to have fruits and/or vegetables at every meal.  · Make exercise part of your daily routine. You may want to start with simple activities, such as walking, bicycling, or slow swimming. Try to be active 30 to 60 minutes every day. You do not need to do all 30 to 60 minutes all at once. For example, you can exercise 3 times a day for 10 or 20 minutes. Moderate exercise is safe for most people, but it is always a good idea to talk to your doctor before starting an exercise program.  · Keep moving. [place] the lawn, work in the garden, or tydy. Take the stairs instead of the elevator at work. · If you smoke, quit. People who smoke have an increased risk for heart attack, stroke, cancer, and other lung illnesses. Quitting is hard, but there are ways to boost your chance of quitting tobacco for good. ? Use nicotine gum, patches, or lozenges. ? Ask your doctor about stop-smoking programs and medicines. ? Keep trying.   In addition to reducing your risk of diseases in the future, you will notice some benefits soon after you stop using tobacco. If you have shortness of breath or asthma symptoms, they will likely get better within a few weeks after you quit. · Limit how much alcohol you drink. Moderate amounts of alcohol (up to 2 drinks a day for men, 1 drink a day for women) are okay. But drinking too much can lead to liver problems, high blood pressure, and other health problems. Family health  If you have a family, there are many things you can do together to improve your health. · Eat meals together as a family as often as possible. · Eat healthy foods. This includes fruits, vegetables, lean meats and dairy, and whole grains. · Include your family in your fitness plan. Most people think of activities such as jogging or tennis as the way to fitness, but there are many ways you and your family can be more active. Anything that makes you breathe hard and gets your heart pumping is exercise. Here are some tips:  ? Walk to do errands or to take your child to school or the bus.  ? Go for a family bike ride after dinner instead of watching TV. Where can you learn more? Go to http://www.gray.com/  Enter G344 in the search box to learn more about \"A Healthy Lifestyle: Care Instructions. \"  Current as of: September 23, 2020               Content Version: 12.8  © 0642-4433 Healthwise, Incorporated. Care instructions adapted under license by [employer] (which disclaims liability or warranty for this information). If you have questions about a medical condition or this instruction, always ask your healthcare professional. [place] [address] any warranty or liability for your use of this information. Anxiety Disorder: Care Instructions  Your Care Instructions     Anxiety is a normal reaction to stress.  Difficult situations can cause you to have symptoms such as sweaty palms and a nervous feeling. In an anxiety disorder, the symptoms are far more severe. Constant worry, muscle tension, trouble sleeping, nausea and diarrhea, and other symptoms can make normal daily activities difficult or impossible. These symptoms may occur for no reason, and they can affect your work, school, or social life. Medicines, counseling, and self-care can all help. Follow-up care is a key part of your treatment and safety. Be sure to make and go to all appointments, and call your doctor if you are having problems. It's also a good idea to know your test results and keep a list of the medicines you take. How can you care for yourself at home? · Take medicines exactly as directed. Call your doctor if you think you are having a problem with your medicine. · Go to your counseling sessions and follow-up appointments. · Recognize and accept your anxiety. Then, when you are in a situation that makes you anxious, say to yourself, \"This is not an emergency. I feel uncomfortable, but I am not in danger. I can keep going even if I feel anxious. \"  · Be kind to your body:  ? Relieve tension with exercise or a massage. ? Get enough rest.  ? Avoid alcohol, caffeine, nicotine, and illegal drugs. They can increase your anxiety level and cause sleep problems. ? Learn and do relaxation techniques. See below for more about these techniques. · Engage your mind. Get out and do something you enjoy. Go to a funny movie, or take a walk or hike. Plan your day. Having too much or too little to do can make you anxious. · Keep a record of your symptoms. Discuss your fears with a good friend or family member, or join a support group for people with similar problems. Talking to others sometimes relieves stress. · Get involved in social groups, or volunteer to help others. Being alone sometimes makes things seem worse than they are. · Get at least 30 minutes of exercise on most days of the week to relieve stress. Walking is a good choice.  You also may want to do other activities, such as running, swimming, cycling, or playing tennis or team sports. Relaxation techniques  Do relaxation exercises 10 to 20 minutes a day. You can play soothing, relaxing music while you do them, if you wish. · Tell others in your house that you are going to do your relaxation exercises. Ask them not to disturb you. · Find a comfortable place, away from all distractions and noise. · Lie down on your back, or sit with your back straight. · Focus on your breathing. Make it slow and steady. · Breathe in through your nose. Breathe out through either your nose or mouth. · Breathe deeply, filling up the area between your navel and your rib cage. Breathe so that your belly goes up and down. · Do not hold your breath. · Breathe like this for 5 to 10 minutes. Notice the feeling of calmness throughout your whole body. As you continue to breathe slowly and deeply, relax by doing the following for another 5 to 10 minutes:  · Tighten and relax each muscle group in your body. You can begin at your toes and work your way up to your head. · Imagine your muscle groups relaxing and becoming heavy. · Empty your mind of all thoughts. · Let yourself relax more and more deeply. · Become aware of the state of calmness that surrounds you. · When your relaxation time is over, you can bring yourself back to alertness by moving your fingers and toes and then your hands and feet and then stretching and moving your entire body. Sometimes people fall asleep during relaxation, but they usually wake up shortly afterward. · Always give yourself time to return to full alertness before you drive a car or do anything that might cause an accident if you are not fully alert. Never play a relaxation tape while you drive a car. When should you call for help? Call 911 anytime you think you may need emergency care.  For example, call if:    · You feel you cannot stop from hurting yourself or someone else.   Keep the numbers for these national suicide hotlines: 6-692-800-TALK (3-130.654.7765) and 3-965-LRFNNOY (2-149.818.1633). If you or someone you know talks about suicide or feeling hopeless, get help right away. Watch closely for changes in your health, and be sure to contact your doctor if:    · You have anxiety or fear that affects your life.     · You have symptoms of anxiety that are new or different from those you had before. Where can you learn more? Go to http://www.yao.com/  Enter P754 in the search box to learn more about \"Anxiety Disorder: Care Instructions. \"  Current as of: September 23, 2020               Content Version: 12.8  © 2006-2021 Healthwise, Incorporated. Care instructions adapted under license by [employer] (which disclaims liability or warranty for this information). If you have questions about a medical condition or this instruction, always ask your healthcare professional. [address] any warranty or liability for your use of this information.

## 2021-08-27 NOTE — PROGRESS NOTES
Subjective:   32 y.o. female for Well Woman Check. Patient's last menstrual period was 08/09/2021 (exact date). Stated she has been on birth control pills but still getting spotting and menstrual cycle. Last menstrual cycle lasted 2 weeks. Discussed alternate method of birth control and change of medication. She agrees to see the OB/GYN. Done the referral. Meantime she wants to continue the same medication. Sexually active. Not using barrier contraceptive. I have discussed the STD prevention. No pelvic pain or vaginal discharge. Not much compliant with self breast exam. According to mom her grandmother both side had breast cancer. She never had abnormal mammogram. Both grandmothers in her 62s. At this time discussed to start mammogram at age 36. Also family history of colon cancer which is also great grandfather in his later age. No parents or sibling had colon cancer. Will start screening from age 39. She is feeling anxious. Feels like more generalized social anxiety. She cannot carry conversation longer or feel panicky with the crowded place. Nav Chakraborty has made it worse. Has been feeling worse since few months. Never been done any counseling or any medication. Willing to do medication that counseling. Discussed the medication dose and side effects. Starting Lexapro. GERD symptoms been stable. No nausea vomiting or abdominal pain. No appetite or weight changes. No urinary or bowel complaint. Seasonal allergies. Taking cetirizine and Singulair. Not much help. Not able to use nasal spray has had a nasal bleeding. At this time no cough cold wheezing or any chest congestion. No fever. We will continue current plan.  If needed we will send her to allergy specialist  Patient Active Problem List    Diagnosis Date Noted    H/O intrinsic asthma 06/26/2018    Menorrhagia with regular cycle 01/29/2018    Focal nodular hyperplasia of liver     ADHD (attention deficit hyperactivity disorder) 12/02/2010    Back pain 12/02/2010     Current Outpatient Medications   Medication Sig Dispense Refill    albuterol (PROVENTIL HFA, VENTOLIN HFA, PROAIR HFA) 90 mcg/actuation inhaler Take 2 Puffs by inhalation every four (4) hours as needed for Wheezing. 1 Inhaler 3    escitalopram oxalate (LEXAPRO) 10 mg tablet Take 1 Tablet by mouth daily. 30 Tablet 1    Tri-Lo-Norma 0.18/0.215/0.25 mg-25 mcg tab TAKE 1 TABLET BY MOUTH EVERY DAY 3 Dose Pack 4    pantoprazole (PROTONIX) 20 mg tablet TAKE 1 TABLET BY MOUTH EVERY DAY 90 Tab 0    montelukast (SINGULAIR) 10 mg tablet Take 1 Tab by mouth daily. 30 Tab 0    cetirizine (ZYRTEC) 10 mg tablet TAKE ONE TABLET BY MOUTH ONE TIME DAILY 90 Tab 0    acetaminophen (TYLENOL) 325 mg tablet Take 650 mg by mouth daily as needed for Pain.  ACETAMINOPHEN/PYRILAM/PAMABROM (PAMPRIN MULTI-SYMPTOM PO) Take  by mouth. No Known Allergies  Past Medical History:   Diagnosis Date    ADHD     weaned off on meds, per pt doing well    AR (allergic rhinitis)     Asthma     Dyslexia     Focal nodular hyperplasia of liver     recommend MRI repeat 7/2014    Kidney stone     RAD (reactive airway disease)      No past surgical history on file.   Family History   Problem Relation Age of Onset    Diabetes Father     Hypertension Brother     Hypertension Maternal Grandfather     Heart Attack Maternal Grandfather     Heart Surgery Maternal Grandfather     Diabetes Paternal Grandmother     Hypertension Paternal Grandmother      Social History     Tobacco Use    Smoking status: Never Smoker    Smokeless tobacco: Never Used   Substance Use Topics    Alcohol use: No     Alcohol/week: 0.0 standard drinks      Lab Results   Component Value Date/Time    WBC 6.9 06/26/2018 10:56 AM    HGB 15.1 06/26/2018 10:56 AM    HCT 47.9 (H) 06/26/2018 10:56 AM    PLATELET 352 25/47/2310 10:56 AM    MCV 94 06/26/2018 10:56 AM     Lab Results   Component Value Date/Time    Sodium 140 06/26/2018 10:56 AM Potassium 4.3 06/26/2018 10:56 AM    Chloride 101 06/26/2018 10:56 AM    CO2 25 06/26/2018 10:56 AM    Anion gap 14.0 06/26/2018 10:56 AM    Glucose 98 06/26/2018 10:56 AM    BUN 15 06/26/2018 10:56 AM    Creatinine 0.8 06/26/2018 10:56 AM    BUN/Creatinine ratio 11 (L) 01/05/2014 01:00 PM    GFR est AA >60 01/05/2014 01:00 PM    GFR est non-AA >60 01/05/2014 01:00 PM    Calcium 9.7 06/26/2018 10:56 AM    Bilirubin, total 0.6 06/26/2018 10:56 AM    Alk. phosphatase 82 06/26/2018 10:56 AM    Protein, total 7.0 06/26/2018 10:56 AM    Albumin 4.3 06/26/2018 10:56 AM    Globulin 2.7 06/26/2018 10:56 AM    A-G Ratio 1.6 06/26/2018 10:56 AM    ALT (SGPT) 10 06/26/2018 10:56 AM    AST (SGOT) 10 06/26/2018 10:56 AM     Lab Results   Component Value Date/Time    Cholesterol, total 149 06/26/2018 10:56 AM    HDL Cholesterol 54 06/26/2018 10:56 AM    LDL, calculated 77 06/26/2018 10:56 AM    VLDL, calculated 18 06/26/2018 10:56 AM    Triglyceride 90 06/26/2018 10:56 AM     Lab Results   Component Value Date/Time    TSH 1.15 06/26/2018 10:56 AM     Lab Results   Component Value Date/Time    Hemoglobin A1c 5.0 06/26/2018 10:56 AM           ROS:  Feeling well. No dyspnea or chest pain on exertion. No abdominal pain, change in bowel habits, black or bloody stools. No urinary tract symptoms. GYN ROS: no breast pain or new or enlarging lumps on self exam. No neurological complaints. Objective:     Visit Vitals  BP 98/68 (BP 1 Location: Left arm, BP Patient Position: Sitting, BP Cuff Size: Adult)   Pulse 62   Temp 97.7 °F (36.5 °C) (Temporal)   Wt 199 lb (90.3 kg)   LMP 08/09/2021 (Exact Date)   BMI 29.39 kg/m²     The patient appears well, alert, oriented x 3, in no distress. ENT normal.  Neck supple. No adenopathy or thyromegaly. KRISTA. Lungs are clear, good air entry, no wheezes, rhonchi or rales. S1 and S2 normal, no murmurs, regular rate and rhythm. Abdomen soft without tenderness, guarding, mass or organomegaly. Extremities show no edema, normal peripheral pulses. Neurological is normal, no focal findings. BREAST EXAM: breasts appear normal, no suspicious masses, no skin or nipple changes or axillary nodes    PELVIC EXAM: normal external genitalia, vulva, vagina,  uterus and adnexa. Generalized erythema over cervical area. Mild spotting during taking the sample. No cervical lesion or genital rash. Breast and pelvic exam done in presence of nurseLF  According to patient's permission patient's mother was present during entire conversation and exam  Assessment/Plan:       ICD-10-CM ICD-9-CM    1. Well woman exam with routine gynecological exam  Z01.419 V72.31     [V72.31]   2. Screening for malignant neoplasm of cervix  Z12.4 V76.2 PAP IG, CT-NG-TV, APTIMA HPV AND RFX 64/44,24([id number])   3. Encounter for other contraceptive management  Z30.8 V25.8 REFERRAL TO OBSTETRICS AND GYNECOLOGY   4. Anxiety: Generalized social anxiety. Does not want to go for counseling. Agreed to start Lexapro. Discussed medication side effects. Follow-up next visit F41.9 300.00 TSH 3RD GENERATION      CBC W/O DIFF      METABOLIC PANEL, COMPREHENSIVE   5. Seasonal allergic rhinitis due to pollen: Continue Singulair and cetirizine J30.1 477.0    6. Gastroesophageal reflux disease without esophagitis: Fairly stable K21.9 530.81    7. Social anxiety disorder  F40.10 300.23    Patient understood agreed with the plan  Reviewed health maintenance  Follow-up and Dispositions    · Return in about 1 month (around 9/27/2021). .Please note that this dictation was completed with [employer], the [employer] voice recognition software. Quite often unanticipated grammatical, syntax, homophones, and other interpretive errors are inadvertently transcribed by the computer software. Please disregard these errors. Please excuse any errors that have escaped final proofreading.

## 2021-08-27 NOTE — PROGRESS NOTES
Chief Complaint   Patient presents with    Well Woman     last pap 2/23/17    Other     labile mood    Anxiety    Menstrual Problem     breakthrough bleeding     1. Have you been to the ER, urgent care clinic since your last visit? Hospitalized since your last visit? [facility] [place]    2. Have you seen or consulted any other health care providers outside of the [address] [place] [address] since your last visit? Include any pap smears or colon screening.  No

## 2021-08-28 LAB
ALBUMIN SERPL-MCNC: 4.2 G/DL (ref 3.9–5)
ALBUMIN/GLOB SERPL: 1.8 {RATIO} (ref 1.2–2.2)
ALP SERPL-CCNC: 61 IU/L (ref 48–121)
ALT SERPL-CCNC: 11 IU/L (ref 0–32)
AST SERPL-CCNC: 13 IU/L (ref 0–40)
BILIRUB SERPL-MCNC: 0.6 MG/DL (ref 0–1.2)
BUN SERPL-MCNC: 14 MG/DL (ref 6–20)
BUN/CREAT SERPL: 18 (ref 9–23)
CALCIUM SERPL-MCNC: 9.2 MG/DL (ref 8.7–10.2)
CHLORIDE SERPL-SCNC: 105 MMOL/L (ref 96–106)
CO2 SERPL-SCNC: 24 MMOL/L (ref 20–29)
CREAT SERPL-MCNC: 0.79 MG/DL (ref 0.57–1)
ERYTHROCYTE [DISTWIDTH] IN BLOOD BY AUTOMATED COUNT: 12.2 % (ref 11.7–15.4)
GLOBULIN SER CALC-MCNC: 2.4 G/DL (ref 1.5–4.5)
GLUCOSE SERPL-MCNC: 91 MG/DL (ref 65–99)
HCT VFR BLD AUTO: 41.7 % (ref 34–46.6)
HGB BLD-MCNC: 14.1 G/DL (ref 11.1–15.9)
MCH RBC QN AUTO: 30.6 PG (ref 26.6–33)
MCHC RBC AUTO-ENTMCNC: 33.8 G/DL (ref 31.5–35.7)
MCV RBC AUTO: 91 FL (ref 79–97)
PLATELET # BLD AUTO: 209 X10E3/UL (ref 150–450)
POTASSIUM SERPL-SCNC: 4.5 MMOL/L (ref 3.5–5.2)
PROT SERPL-MCNC: 6.6 G/DL (ref 6–8.5)
RBC # BLD AUTO: 4.61 X10E6/UL (ref 3.77–5.28)
SODIUM SERPL-SCNC: 141 MMOL/L (ref 134–144)
TSH SERPL DL<=0.005 MIU/L-ACNC: 1.38 UIU/ML (ref 0.45–4.5)
WBC # BLD AUTO: 6.3 X10E3/UL (ref 3.4–10.8)

## 2021-09-02 LAB
C TRACH RRNA CVX QL NAA+PROBE: NEGATIVE
CYTOLOGIST CVX/VAG CYTO: NORMAL
CYTOLOGY CVX/VAG DOC CYTO: NORMAL
CYTOLOGY CVX/VAG DOC THIN PREP: NORMAL
DX ICD CODE: NORMAL
HPV I/H RISK 4 DNA CVX QL PROBE+SIG AMP: NEGATIVE
Lab: NORMAL
N GONORRHOEA RRNA CVX QL NAA+PROBE: NEGATIVE
OTHER STN SPEC: NORMAL
STAT OF ADQ CVX/VAG CYTO-IMP: NORMAL
T VAGINALIS RRNA SPEC QL NAA+PROBE: NEGATIVE

## 2021-09-21 RX ORDER — ESCITALOPRAM OXALATE 10 MG/1
10 TABLET ORAL DAILY
Qty: 90 TABLET | Refills: 1 | Status: SHIPPED | OUTPATIENT
Start: 2021-09-21

## 2021-09-21 NOTE — TELEPHONE ENCOUNTER
This pharmacy faxed over request for the following prescriptions to be filled:    Medication requested :   Requested Prescriptions     Pending Prescriptions Disp Refills    escitalopram oxalate (LEXAPRO) 10 mg tablet 90 Tablet 0     Sig: Take 1 Tablet by mouth daily.      PCP: Maya Key or Print: [facility]  Mail order or Local pharmacy [place] [id number]     Scheduled appointment if not seen by current providers in office:  LOV 8/27/2021 f/u 9/30/2021

## 2021-10-05 DIAGNOSIS — N92.0 MENORRHAGIA WITH REGULAR CYCLE: ICD-10-CM

## 2021-10-06 RX ORDER — NORGESTIMATE AND ETHINYL ESTRADIOL 7DAYSX3 LO
1 KIT ORAL DAILY
Qty: 3 DOSE PACK | Refills: 4 | Status: SHIPPED | OUTPATIENT
Start: 2021-10-06 | End: 2021-12-29 | Stop reason: SDUPTHER

## 2021-10-26 ENCOUNTER — OFFICE VISIT (OUTPATIENT)
Dept: FAMILY MEDICINE CLINIC | Age: 26
End: 2021-10-26
Payer: MEDICAID

## 2021-10-26 VITALS
HEIGHT: 69 IN | DIASTOLIC BLOOD PRESSURE: 82 MMHG | WEIGHT: 211 LBS | RESPIRATION RATE: 16 BRPM | BODY MASS INDEX: 31.25 KG/M2 | TEMPERATURE: 97.6 F | HEART RATE: 75 BPM | SYSTOLIC BLOOD PRESSURE: 122 MMHG | OXYGEN SATURATION: 98 %

## 2021-10-26 DIAGNOSIS — F41.9 ANXIETY: Primary | ICD-10-CM

## 2021-10-26 PROCEDURE — 99212 OFFICE O/P EST SF 10 MIN: CPT | Performed by: FAMILY MEDICINE

## 2021-10-26 NOTE — PROGRESS NOTES
Chief Complaint   Patient presents with    Anxiety     states medication is helping a lot    GERD     doing ok     1. \"Have you been to the ER, urgent care clinic since your last visit? Hospitalized since your last visit? \" No    2. \"Have you seen or consulted any other health care providers outside of the [address] [place] [address] since your last visit? \" No     3. For patients aged 39-70: Has the patient had a colonoscopy? No     If the patient is female:    4. For patients aged 41-77: Has the patient had a mammogram within the past 2 years? No    5. For patients aged 21-65: Has the patient had a pap smear?  Yes, HM satisfied with blue hyperlink

## 2021-10-26 NOTE — PATIENT INSTRUCTIONS

## 2021-12-29 DIAGNOSIS — N92.0 MENORRHAGIA WITH REGULAR CYCLE: ICD-10-CM

## 2022-01-03 RX ORDER — NORGESTIMATE AND ETHINYL ESTRADIOL 7DAYSX3 LO
1 KIT ORAL DAILY
Qty: 3 DOSE PACK | Refills: 4 | Status: SHIPPED | OUTPATIENT
Start: 2022-01-03 | End: 2022-03-27 | Stop reason: SDUPTHER

## 2022-03-18 PROBLEM — N92.0 MENORRHAGIA WITH REGULAR CYCLE: Status: ACTIVE | Noted: 2018-01-29

## 2022-03-19 PROBLEM — Z87.09 H/O INTRINSIC ASTHMA: Status: ACTIVE | Noted: 2018-06-26

## 2022-03-27 DIAGNOSIS — N92.0 MENORRHAGIA WITH REGULAR CYCLE: ICD-10-CM

## 2022-03-28 RX ORDER — NORGESTIMATE AND ETHINYL ESTRADIOL 7DAYSX3 LO
1 KIT ORAL DAILY
Qty: 3 DOSE PACK | Refills: 4 | Status: SHIPPED | OUTPATIENT
Start: 2022-03-28

## 2023-06-05 DIAGNOSIS — N92.0 EXCESSIVE AND FREQUENT MENSTRUATION WITH REGULAR CYCLE: ICD-10-CM

## 2023-06-06 RX ORDER — NORGESTIMATE AND ETHINYL ESTRADIOL
KIT
Qty: 84 TABLET | Refills: 4 | OUTPATIENT
Start: 2023-06-06